# Patient Record
Sex: FEMALE | Race: WHITE | Employment: OTHER | URBAN - METROPOLITAN AREA
[De-identification: names, ages, dates, MRNs, and addresses within clinical notes are randomized per-mention and may not be internally consistent; named-entity substitution may affect disease eponyms.]

---

## 2017-04-26 PROBLEM — M25.561 CHRONIC PAIN OF BOTH KNEES: Status: ACTIVE | Noted: 2017-04-26

## 2017-04-26 PROBLEM — M25.562 CHRONIC PAIN OF BOTH KNEES: Status: ACTIVE | Noted: 2017-04-26

## 2017-04-26 PROBLEM — G89.29 CHRONIC PAIN OF BOTH KNEES: Status: ACTIVE | Noted: 2017-04-26

## 2017-07-26 PROCEDURE — 82570 ASSAY OF URINE CREATININE: CPT | Performed by: FAMILY MEDICINE

## 2017-07-26 PROCEDURE — 82043 UR ALBUMIN QUANTITATIVE: CPT | Performed by: FAMILY MEDICINE

## 2018-04-08 PROBLEM — I69.391 DYSPHAGIA DUE TO OLD CEREBROVASCULAR ACCIDENT: Status: ACTIVE | Noted: 2018-04-08

## 2019-03-15 PROCEDURE — 81001 URINALYSIS AUTO W/SCOPE: CPT | Performed by: FAMILY MEDICINE

## 2019-03-15 PROCEDURE — 87086 URINE CULTURE/COLONY COUNT: CPT | Performed by: FAMILY MEDICINE

## 2019-03-15 PROCEDURE — 87077 CULTURE AEROBIC IDENTIFY: CPT | Performed by: FAMILY MEDICINE

## 2019-03-15 PROCEDURE — 87186 SC STD MICRODIL/AGAR DIL: CPT | Performed by: FAMILY MEDICINE

## 2019-09-12 PROCEDURE — 81001 URINALYSIS AUTO W/SCOPE: CPT | Performed by: FAMILY MEDICINE

## 2019-09-12 PROCEDURE — 87086 URINE CULTURE/COLONY COUNT: CPT | Performed by: FAMILY MEDICINE

## 2020-01-22 ENCOUNTER — APPOINTMENT (OUTPATIENT)
Dept: CV DIAGNOSTICS | Facility: HOSPITAL | Age: 75
DRG: 306 | End: 2020-01-22
Attending: INTERNAL MEDICINE
Payer: MEDICARE

## 2020-01-22 ENCOUNTER — APPOINTMENT (OUTPATIENT)
Dept: GENERAL RADIOLOGY | Facility: HOSPITAL | Age: 75
DRG: 306 | End: 2020-01-22
Attending: INTERNAL MEDICINE
Payer: MEDICARE

## 2020-01-22 ENCOUNTER — HOSPITAL ENCOUNTER (INPATIENT)
Facility: HOSPITAL | Age: 75
LOS: 7 days | Discharge: NURSING, REHAB OR LONG-TERM CARE | DRG: 306 | End: 2020-01-29
Attending: INTERNAL MEDICINE | Admitting: INTERNAL MEDICINE
Payer: MEDICARE

## 2020-01-22 PROBLEM — D64.9 ANEMIA: Status: ACTIVE | Noted: 2020-01-22

## 2020-01-22 PROBLEM — I34.0 SEVERE MITRAL INSUFFICIENCY: Status: ACTIVE | Noted: 2020-01-22

## 2020-01-22 PROBLEM — I50.30 DIASTOLIC CHF DUE TO VALVULAR DISEASE (HCC): Status: ACTIVE | Noted: 2020-01-22

## 2020-01-22 PROBLEM — Z95.5 HISTORY OF CORONARY ANGIOPLASTY WITH INSERTION OF STENT: Status: ACTIVE | Noted: 2020-01-22

## 2020-01-22 PROBLEM — I38 DIASTOLIC CHF DUE TO VALVULAR DISEASE (HCC): Status: ACTIVE | Noted: 2020-01-22

## 2020-01-22 PROBLEM — I25.10 CORONARY ARTERY DISEASE INVOLVING NATIVE CORONARY ARTERY OF NATIVE HEART WITHOUT ANGINA PECTORIS: Status: ACTIVE | Noted: 2020-01-22

## 2020-01-22 LAB
ALBUMIN SERPL-MCNC: 2.9 G/DL (ref 3.4–5)
ALBUMIN/GLOB SERPL: 0.7 {RATIO} (ref 1–2)
ALP LIVER SERPL-CCNC: 56 U/L (ref 55–142)
ALT SERPL-CCNC: 15 U/L (ref 13–56)
ANION GAP SERPL CALC-SCNC: 6 MMOL/L (ref 0–18)
APTT PPP: 20.1 SECONDS (ref 25.4–36.1)
AST SERPL-CCNC: 12 U/L (ref 15–37)
BASOPHILS # BLD AUTO: 0.01 X10(3) UL (ref 0–0.2)
BASOPHILS NFR BLD AUTO: 0.1 %
BILIRUB SERPL-MCNC: 0.4 MG/DL (ref 0.1–2)
BUN BLD-MCNC: 44 MG/DL (ref 7–18)
BUN/CREAT SERPL: 31 (ref 10–20)
CALCIUM BLD-MCNC: 9.1 MG/DL (ref 8.5–10.1)
CHLORIDE SERPL-SCNC: 107 MMOL/L (ref 98–112)
CO2 SERPL-SCNC: 26 MMOL/L (ref 21–32)
CREAT BLD-MCNC: 1.42 MG/DL (ref 0.55–1.02)
DEPRECATED RDW RBC AUTO: 61.2 FL (ref 35.1–46.3)
EOSINOPHIL # BLD AUTO: 0.07 X10(3) UL (ref 0–0.7)
EOSINOPHIL NFR BLD AUTO: 0.4 %
ERYTHROCYTE [DISTWIDTH] IN BLOOD BY AUTOMATED COUNT: 18 % (ref 11–15)
EST. AVERAGE GLUCOSE BLD GHB EST-MCNC: 128 MG/DL (ref 68–126)
GLOBULIN PLAS-MCNC: 4.1 G/DL (ref 2.8–4.4)
GLUCOSE BLD-MCNC: 197 MG/DL (ref 70–99)
GLUCOSE BLD-MCNC: 222 MG/DL (ref 70–99)
GLUCOSE BLD-MCNC: 272 MG/DL (ref 70–99)
GLUCOSE BLD-MCNC: 302 MG/DL (ref 70–99)
HBA1C MFR BLD HPLC: 6.1 % (ref ?–5.7)
HCT VFR BLD AUTO: 26.3 % (ref 35–48)
HGB BLD-MCNC: 8 G/DL (ref 12–16)
IMM GRANULOCYTES # BLD AUTO: 0.19 X10(3) UL (ref 0–1)
IMM GRANULOCYTES NFR BLD: 1 %
INR BLD: 1.04 (ref 0.9–1.1)
IRON SATURATION: 9 % (ref 15–50)
IRON SERPL-MCNC: 25 UG/DL (ref 50–170)
LYMPHOCYTES # BLD AUTO: 0.8 X10(3) UL (ref 1–4)
LYMPHOCYTES NFR BLD AUTO: 4.3 %
M PROTEIN MFR SERPL ELPH: 7 G/DL (ref 6.4–8.2)
MCH RBC QN AUTO: 28.5 PG (ref 26–34)
MCHC RBC AUTO-ENTMCNC: 30.4 G/DL (ref 31–37)
MCV RBC AUTO: 93.6 FL (ref 80–100)
MONOCYTES # BLD AUTO: 0.4 X10(3) UL (ref 0.1–1)
MONOCYTES NFR BLD AUTO: 2.1 %
NEUTROPHILS # BLD AUTO: 17.18 X10 (3) UL (ref 1.5–7.7)
NEUTROPHILS # BLD AUTO: 17.18 X10(3) UL (ref 1.5–7.7)
NEUTROPHILS NFR BLD AUTO: 92.1 %
OSMOLALITY SERPL CALC.SUM OF ELEC: 306 MOSM/KG (ref 275–295)
PLATELET # BLD AUTO: 237 10(3)UL (ref 150–450)
POTASSIUM SERPL-SCNC: 4.3 MMOL/L (ref 3.5–5.1)
PSA SERPL DL<=0.01 NG/ML-MCNC: 14 SECONDS (ref 12.5–14.7)
RBC # BLD AUTO: 2.81 X10(6)UL (ref 3.8–5.3)
SODIUM SERPL-SCNC: 139 MMOL/L (ref 136–145)
TOTAL IRON BINDING CAPACITY: 283 UG/DL (ref 240–450)
TRANSFERRIN SERPL-MCNC: 190 MG/DL (ref 200–360)
VANCOMYCIN SERPL-MCNC: 23.5 UG/ML
WBC # BLD AUTO: 18.7 X10(3) UL (ref 4–11)

## 2020-01-22 PROCEDURE — 93306 TTE W/DOPPLER COMPLETE: CPT | Performed by: INTERNAL MEDICINE

## 2020-01-22 PROCEDURE — 71045 X-RAY EXAM CHEST 1 VIEW: CPT | Performed by: INTERNAL MEDICINE

## 2020-01-22 PROCEDURE — 80202 ASSAY OF VANCOMYCIN: CPT | Performed by: HOSPITALIST

## 2020-01-22 PROCEDURE — B246ZZ4 ULTRASONOGRAPHY OF RIGHT AND LEFT HEART, TRANSESOPHAGEAL: ICD-10-PCS | Performed by: INTERNAL MEDICINE

## 2020-01-22 PROCEDURE — 85610 PROTHROMBIN TIME: CPT | Performed by: NURSE PRACTITIONER

## 2020-01-22 PROCEDURE — 80053 COMPREHEN METABOLIC PANEL: CPT | Performed by: NURSE PRACTITIONER

## 2020-01-22 PROCEDURE — 94640 AIRWAY INHALATION TREATMENT: CPT

## 2020-01-22 PROCEDURE — 99223 1ST HOSP IP/OBS HIGH 75: CPT | Performed by: INTERNAL MEDICINE

## 2020-01-22 PROCEDURE — 87040 BLOOD CULTURE FOR BACTERIA: CPT | Performed by: INTERNAL MEDICINE

## 2020-01-22 PROCEDURE — 83036 HEMOGLOBIN GLYCOSYLATED A1C: CPT | Performed by: HOSPITALIST

## 2020-01-22 PROCEDURE — 85025 COMPLETE CBC W/AUTO DIFF WBC: CPT | Performed by: NURSE PRACTITIONER

## 2020-01-22 PROCEDURE — 83540 ASSAY OF IRON: CPT | Performed by: INTERNAL MEDICINE

## 2020-01-22 PROCEDURE — 5A09557 ASSISTANCE WITH RESPIRATORY VENTILATION, GREATER THAN 96 CONSECUTIVE HOURS, CONTINUOUS POSITIVE AIRWAY PRESSURE: ICD-10-PCS | Performed by: INTERNAL MEDICINE

## 2020-01-22 PROCEDURE — 94664 DEMO&/EVAL PT USE INHALER: CPT

## 2020-01-22 PROCEDURE — 92610 EVALUATE SWALLOWING FUNCTION: CPT

## 2020-01-22 PROCEDURE — 87040 BLOOD CULTURE FOR BACTERIA: CPT | Performed by: NURSE PRACTITIONER

## 2020-01-22 PROCEDURE — 85730 THROMBOPLASTIN TIME PARTIAL: CPT | Performed by: NURSE PRACTITIONER

## 2020-01-22 PROCEDURE — 82962 GLUCOSE BLOOD TEST: CPT

## 2020-01-22 PROCEDURE — 83550 IRON BINDING TEST: CPT | Performed by: INTERNAL MEDICINE

## 2020-01-22 PROCEDURE — 94660 CPAP INITIATION&MGMT: CPT

## 2020-01-22 RX ORDER — LOSARTAN POTASSIUM 50 MG/1
50 TABLET ORAL DAILY
Status: DISCONTINUED | OUTPATIENT
Start: 2020-01-23 | End: 2020-01-25

## 2020-01-22 RX ORDER — CALCIUM CARBONATE 200(500)MG
1 TABLET,CHEWABLE ORAL 3 TIMES DAILY PRN
COMMUNITY

## 2020-01-22 RX ORDER — PREDNISONE 20 MG/1
40 TABLET ORAL DAILY
Status: ON HOLD | COMMUNITY
End: 2020-01-27

## 2020-01-22 RX ORDER — LOSARTAN POTASSIUM 100 MG/1
100 TABLET ORAL DAILY
Status: DISCONTINUED | OUTPATIENT
Start: 2020-01-23 | End: 2020-01-22

## 2020-01-22 RX ORDER — IPRATROPIUM BROMIDE AND ALBUTEROL SULFATE 2.5; .5 MG/3ML; MG/3ML
3 SOLUTION RESPIRATORY (INHALATION) EVERY 4 HOURS PRN
Status: DISCONTINUED | OUTPATIENT
Start: 2020-01-22 | End: 2020-01-22

## 2020-01-22 RX ORDER — SUCRALFATE 1 G/1
1 TABLET ORAL
Status: DISCONTINUED | OUTPATIENT
Start: 2020-01-22 | End: 2020-01-29

## 2020-01-22 RX ORDER — HYDROCHLOROTHIAZIDE 25 MG/1
25 TABLET ORAL DAILY
Status: DISCONTINUED | OUTPATIENT
Start: 2020-01-23 | End: 2020-01-22

## 2020-01-22 RX ORDER — ASPIRIN 81 MG/1
81 TABLET ORAL DAILY
Status: DISCONTINUED | OUTPATIENT
Start: 2020-01-23 | End: 2020-01-22

## 2020-01-22 RX ORDER — BUDESONIDE 0.5 MG/2ML
0.5 INHALANT ORAL
Status: DISCONTINUED | OUTPATIENT
Start: 2020-01-22 | End: 2020-01-29

## 2020-01-22 RX ORDER — PREDNISONE 20 MG/1
40 TABLET ORAL
Status: DISCONTINUED | OUTPATIENT
Start: 2020-01-23 | End: 2020-01-26

## 2020-01-22 RX ORDER — TORSEMIDE 20 MG/1
20 TABLET ORAL DAILY
COMMUNITY

## 2020-01-22 RX ORDER — ISOSORBIDE DINITRATE 5 MG/1
5 TABLET ORAL 3 TIMES DAILY
Status: ON HOLD | COMMUNITY
End: 2020-01-28

## 2020-01-22 RX ORDER — ISOSORBIDE DINITRATE 5 MG/1
5 TABLET ORAL 3 TIMES DAILY
Status: DISCONTINUED | OUTPATIENT
Start: 2020-01-22 | End: 2020-01-22

## 2020-01-22 RX ORDER — ACETAMINOPHEN 325 MG/1
650 TABLET ORAL EVERY 6 HOURS PRN
Status: DISCONTINUED | OUTPATIENT
Start: 2020-01-22 | End: 2020-01-29

## 2020-01-22 RX ORDER — ATORVASTATIN CALCIUM 20 MG/1
20 TABLET, FILM COATED ORAL NIGHTLY
Status: DISCONTINUED | OUTPATIENT
Start: 2020-01-22 | End: 2020-01-29

## 2020-01-22 RX ORDER — TORSEMIDE 20 MG/1
20 TABLET ORAL DAILY
Status: DISCONTINUED | OUTPATIENT
Start: 2020-01-23 | End: 2020-01-22

## 2020-01-22 RX ORDER — HYDRALAZINE HYDROCHLORIDE 25 MG/1
25 TABLET, FILM COATED ORAL 3 TIMES DAILY
Status: DISCONTINUED | OUTPATIENT
Start: 2020-01-22 | End: 2020-01-22 | Stop reason: DRUGHIGH

## 2020-01-22 RX ORDER — IPRATROPIUM BROMIDE AND ALBUTEROL SULFATE 2.5; .5 MG/3ML; MG/3ML
3 SOLUTION RESPIRATORY (INHALATION) EVERY 4 HOURS
Status: DISCONTINUED | OUTPATIENT
Start: 2020-01-22 | End: 2020-01-22

## 2020-01-22 RX ORDER — MAGNESIUM HYDROXIDE/ALUMINUM HYDROXICE/SIMETHICONE 120; 1200; 1200 MG/30ML; MG/30ML; MG/30ML
30 SUSPENSION ORAL EVERY 4 HOURS PRN
COMMUNITY

## 2020-01-22 RX ORDER — NITROGLYCERIN 0.4 MG/1
0.4 TABLET SUBLINGUAL EVERY 5 MIN PRN
Status: DISCONTINUED | OUTPATIENT
Start: 2020-01-22 | End: 2020-01-29

## 2020-01-22 RX ORDER — IPRATROPIUM BROMIDE AND ALBUTEROL SULFATE 2.5; .5 MG/3ML; MG/3ML
3 SOLUTION RESPIRATORY (INHALATION)
Status: DISCONTINUED | OUTPATIENT
Start: 2020-01-22 | End: 2020-01-28

## 2020-01-22 RX ORDER — FUROSEMIDE 10 MG/ML
20 INJECTION INTRAMUSCULAR; INTRAVENOUS
Status: DISCONTINUED | OUTPATIENT
Start: 2020-01-22 | End: 2020-01-25

## 2020-01-22 RX ORDER — GARLIC EXTRACT 500 MG
1 CAPSULE ORAL DAILY
Status: ON HOLD | COMMUNITY
End: 2020-01-22

## 2020-01-22 RX ORDER — LEVOTHYROXINE SODIUM 0.05 MG/1
50 TABLET ORAL
Status: DISCONTINUED | OUTPATIENT
Start: 2020-01-23 | End: 2020-01-29

## 2020-01-22 RX ORDER — HYDRALAZINE HYDROCHLORIDE 10 MG/1
10 TABLET, FILM COATED ORAL EVERY 8 HOURS
Status: DISCONTINUED | OUTPATIENT
Start: 2020-01-22 | End: 2020-01-23

## 2020-01-22 RX ORDER — PANTOPRAZOLE SODIUM 40 MG/1
40 TABLET, DELAYED RELEASE ORAL
Status: DISCONTINUED | OUTPATIENT
Start: 2020-01-22 | End: 2020-01-22 | Stop reason: ALTCHOICE

## 2020-01-22 RX ORDER — ACETAMINOPHEN 500 MG
500 TABLET ORAL EVERY 6 HOURS PRN
COMMUNITY

## 2020-01-22 RX ORDER — DEXTROSE MONOHYDRATE 25 G/50ML
50 INJECTION, SOLUTION INTRAVENOUS
Status: DISCONTINUED | OUTPATIENT
Start: 2020-01-22 | End: 2020-01-23

## 2020-01-22 RX ORDER — SUCRALFATE 1 G/1
1 TABLET ORAL
COMMUNITY

## 2020-01-22 RX ORDER — PANTOPRAZOLE SODIUM 40 MG/1
40 TABLET, DELAYED RELEASE ORAL
Status: ON HOLD | COMMUNITY
End: 2020-01-29

## 2020-01-22 RX ORDER — CALCIUM CARBONATE 200(500)MG
500 TABLET,CHEWABLE ORAL 3 TIMES DAILY PRN
Status: DISCONTINUED | OUTPATIENT
Start: 2020-01-22 | End: 2020-01-29

## 2020-01-22 RX ORDER — ISOSORBIDE DINITRATE 5 MG/1
5 TABLET ORAL EVERY 8 HOURS
Status: DISCONTINUED | OUTPATIENT
Start: 2020-01-22 | End: 2020-01-23

## 2020-01-22 RX ORDER — AMLODIPINE BESYLATE 5 MG/1
5 TABLET ORAL DAILY
Status: DISCONTINUED | OUTPATIENT
Start: 2020-01-23 | End: 2020-01-22

## 2020-01-22 RX ORDER — GARLIC EXTRACT 500 MG
1 CAPSULE ORAL DAILY
Status: DISCONTINUED | OUTPATIENT
Start: 2020-01-22 | End: 2020-01-29

## 2020-01-22 RX ORDER — HEPARIN SODIUM 5000 [USP'U]/ML
5000 INJECTION, SOLUTION INTRAVENOUS; SUBCUTANEOUS EVERY 12 HOURS SCHEDULED
Status: DISCONTINUED | OUTPATIENT
Start: 2020-01-22 | End: 2020-01-22

## 2020-01-22 NOTE — SLP NOTE
ADULT SWALLOWING EVALUATION    ASSESSMENT    ASSESSMENT/OVERALL IMPRESSION:  Orders were received for a bedside swallowing evaluation as patient with a history of oropharyngeal dysphagia with aspiration.  She was at an outside hospital where she was on thin straws; Slow rate;Small bites and sips  Aspiration Precautions: Upright position; Slow rate;Small bites and sips; No straw  Medication Administration Recommendations: No restrictions; One pill at a time; Whole in puree  Treatment Plan/Recommendations: Dysphagia 94%)  Consistencies Trialed: Nectar thick liquids;Puree;Hard solid  Method of Presentation: Self presentation;Spoon;Cup;Single sips  Patient Positioning: Upright;Midline    Oral Phase of Swallow: Impaired  Bolus Retrieval: Intact  Bilabial Seal: Intact  Jesus Alberto

## 2020-01-22 NOTE — PLAN OF CARE
Problem: Impaired Swallowing  Goal: Minimize aspiration risk  Description  Interventions:  - Patient should be alert and upright for all feedings (90 degrees preferred)  - Offer food and liquids at a slow rate  - No straws  - Encourage small bites of connie

## 2020-01-22 NOTE — H&P
[facility]  Cardiology H&P    Brisa Hernandez Patient Status:  Inpatient    6/10/1945 MRN [id number]   [facility] 7NE-A Attending Brie Ruff MD   Hosp Day # 0 PCP Keisha Posey MD         History of Present Illness:  Brisa Hernandez is liquids.     History:  Past Medical History:   Diagnosis Date   • CAD (coronary artery disease)    • CVA (cerebral vascular accident) ([facility] [address] 7/2010   • Depression    • Heart burn    • Hypothyroidism    • Osteoarthrosis, unspecified whether generalized or loc injection 5,000 Units, 5,000 Units, Subcutaneous, 2 times per day  •  [START ON 1/23/2020] amLODIPine Besylate (NORVASC) tab 5 mg, 5 mg, Oral, Daily  •  [START ON 1/23/2020] aspirin EC tab 81 mg, 81 mg, Oral, Daily  •  atorvastatin (LIPITOR) tab 20 mg, 20 jvd; carotids: no bruits; no tm; mouth moist.   Cardiac: Regular rate and rhythm. S1, S2 normal. no pericardial rub, S3.  2/6 holosystolic murmur at the left lower sternal border radiating to the anterior axillary line.   Lungs: Expiratory prolongation with Infectious disease consultation for evaluation of adequacy of prior work-up at [facility] [place] for endocarditis. 4.  Iron studies- venofer if low. 5.  Unloading therapy for her mitral regurgitation.   Would like to increase hydralazine as her blood pres

## 2020-01-22 NOTE — PROGRESS NOTES
01/22/20 1504   Clinical Encounter Type   Visited With Patient and family together  (Brother Matt Whitley) DAGMAR KWAN [facility])   Routine Visit   (Responded to POLST page)   Patient Spiritual Encounters   Spiritual Assessment Completed Yes    received POLST consul

## 2020-01-22 NOTE — CONSULTS
[facility] hospitalist Initial consult note  PCP Willem Ogden MD  Consulted at the request of Dr. Laura Ibrahim    HPI 68 yo female with multiple medical problems including but not limited to CAD, CVA, DM2, HTN, Hypothyroidism  Here per cardiology recommendation  P (PROSTATE) Father    • Other (thyroid disease) Father      Social History    Socioeconomic History      Marital status: Single      Spouse name: Not on file      Number of children: Not on file      Years of education: Not on file      Highest education le TID'  Lactobacillus  Levothyroxine 50 mcg Po daily  Losartan 50 mg PO daily  Metoprolol succinate ER 12.5 PO BID  Pantoprazole 40 mg PO bid  Zosyn stop date 1/24/20  Prednisone 40 mg PO daily  Saline flush  Sodium chronic IV solution 250 mL  Sucralfate 1 g advised BID      NAKUL per protocol    Hypothyroidism; levothyroxine    Hx of CVA swallow eval    Dm2 on insulin ordered, monitor glucose    Preventative SCDs  Not on anticoagulation due to ? ??GI bleed (was reportedly heme positive at OSH)    High complexity

## 2020-01-22 NOTE — CONSULTS
[facility]  Report of Consultation    Rocío Gross Patient Status:  Inpatient    6/10/1945 MRN [id number]   [facility] 7NE-A Attending Dali Fernandez MD   Hosp Day # 0 PCP Marcello Stepehns MD     Reason for Consultation:  Pre-op clear defect      Past Surgical History:   Procedure Laterality Date   • CATH PERCUTANEOUS  TRANSLUMINAL CORONARY ANGIOPLASTY      STENT   • CHOLECYSTECTOMY     • D & C     • LAP, SURG; COLECTOMY, PARTIAL, W/END COLOSTOMY & CLOSURE, DISTAL SEGMENT     • THYROIDE Oral, Q15 Min PRN **OR** dextrose 50 % injection 50 mL, 50 mL, Intravenous, Q15 Min PRN **OR** glucose (DEX4) oral liquid 30 g, 30 g, Oral, Q15 Min PRN **OR** Glucose-Vitamin C (DEX-4) chewable tab 8 tablet, 8 tablet, Oral, Q15 Min PRN  •  Insulin Aspart P cooperative, oriented. No respiratory distress on O2. Head: Normocephalic, without obvious abnormality, atraumatic. Eyes: Conjunctivae/corneas clear. No scleral icterus. No conjunctival     hemorrhage.    Nose: Nares normal.   Throat: Lips, mucosa, a mass/vegetation concerning for endocarditis. Pt had refused abx in the past  · Acute on chronic anemia- felt to be due to GI source, not a candidate for endoscopy at the present time  · NAKUL- on bipap x10 years  · IDDM type 2  · CAD  · CVA- 2010.   Paola Llanes

## 2020-01-23 LAB
CHOLEST SMN-MCNC: 141 MG/DL (ref ?–200)
CREAT BLD-MCNC: 1.44 MG/DL (ref 0.55–1.02)
GLUCOSE BLD-MCNC: 152 MG/DL (ref 70–99)
GLUCOSE BLD-MCNC: 225 MG/DL (ref 70–99)
GLUCOSE BLD-MCNC: 292 MG/DL (ref 70–99)
GLUCOSE BLD-MCNC: 295 MG/DL (ref 70–99)
HDLC SERPL-MCNC: 36 MG/DL (ref 40–59)
LDLC SERPL CALC-MCNC: 68 MG/DL (ref ?–100)
NONHDLC SERPL-MCNC: 105 MG/DL (ref ?–130)
TRIGL SERPL-MCNC: 184 MG/DL (ref 30–149)
VLDLC SERPL CALC-MCNC: 37 MG/DL (ref 0–30)

## 2020-01-23 PROCEDURE — 82962 GLUCOSE BLOOD TEST: CPT

## 2020-01-23 PROCEDURE — 99233 SBSQ HOSP IP/OBS HIGH 50: CPT | Performed by: INTERNAL MEDICINE

## 2020-01-23 PROCEDURE — 97530 THERAPEUTIC ACTIVITIES: CPT

## 2020-01-23 PROCEDURE — 94640 AIRWAY INHALATION TREATMENT: CPT

## 2020-01-23 PROCEDURE — 97161 PT EVAL LOW COMPLEX 20 MIN: CPT

## 2020-01-23 PROCEDURE — 92526 ORAL FUNCTION THERAPY: CPT

## 2020-01-23 PROCEDURE — 80061 LIPID PANEL: CPT | Performed by: HOSPITALIST

## 2020-01-23 PROCEDURE — 94664 DEMO&/EVAL PT USE INHALER: CPT

## 2020-01-23 PROCEDURE — 82565 ASSAY OF CREATININE: CPT | Performed by: HOSPITALIST

## 2020-01-23 PROCEDURE — 97165 OT EVAL LOW COMPLEX 30 MIN: CPT

## 2020-01-23 PROCEDURE — 94010 BREATHING CAPACITY TEST: CPT

## 2020-01-23 RX ORDER — VANCOMYCIN HYDROCHLORIDE
1250
Status: DISCONTINUED | OUTPATIENT
Start: 2020-01-23 | End: 2020-01-23

## 2020-01-23 RX ORDER — MELATONIN
1000 DAILY
Status: DISCONTINUED | OUTPATIENT
Start: 2020-01-23 | End: 2020-01-29

## 2020-01-23 RX ORDER — CODEINE PHOSPHATE AND GUAIFENESIN 10; 100 MG/5ML; MG/5ML
5 SOLUTION ORAL EVERY 6 HOURS PRN
Status: DISCONTINUED | OUTPATIENT
Start: 2020-01-23 | End: 2020-01-29

## 2020-01-23 RX ORDER — BENZONATATE 100 MG/1
100 CAPSULE ORAL 3 TIMES DAILY PRN
Status: DISCONTINUED | OUTPATIENT
Start: 2020-01-23 | End: 2020-01-29

## 2020-01-23 RX ORDER — DEXTROSE MONOHYDRATE 25 G/50ML
50 INJECTION, SOLUTION INTRAVENOUS
Status: DISCONTINUED | OUTPATIENT
Start: 2020-01-23 | End: 2020-01-25

## 2020-01-23 RX ORDER — HYDRALAZINE HYDROCHLORIDE 25 MG/1
25 TABLET, FILM COATED ORAL EVERY 8 HOURS
Status: DISCONTINUED | OUTPATIENT
Start: 2020-01-23 | End: 2020-01-29

## 2020-01-23 RX ORDER — ISOSORBIDE DINITRATE 10 MG/1
10 TABLET ORAL EVERY 8 HOURS
Status: DISCONTINUED | OUTPATIENT
Start: 2020-01-23 | End: 2020-01-29

## 2020-01-23 NOTE — PHYSICAL THERAPY NOTE
PHYSICAL THERAPY QUICK EVALUATION - INPATIENT    Room Number: 7303/5484-T  Evaluation Date: 1/23/2020  Presenting Problem: Severe mitral insufficiency, CHF  Physician Order: PT Eval and Treat    History related to current admission  Jose Quezada is a 69 y diabetes mellitus without mention of complication, not stated as uncontrolled    • Unspecified essential hypertension    • Visual field defect        Past Surgical History  Past Surgical History:   Procedure Laterality Date   • CATH PERCUTANEOUS  TRANSLUMI ACTIVITY TOLERANCE  Pulse: 69  Heart Rate Source: Monitor  Resp: 17  BP: 123/54  BP Location: Left arm  BP Method: Automatic  Patient Position: Sitting    O2 WALK        SPO2 Ambulation on Oxygen: 94  Ambulation oxygen flow (liters per minute): 3 role of PT, POC, DC planning/recs, positioning, and activity. Patient End of Session: Up in chair;Needs met;Call light within reach;RN aware of session/findings; All patient questions and concerns addressed; Alarm set    ASSESSMENT   Patient is a 69 year previous, functional level   Patient able to ambulate on level surfaces At previous, functional level

## 2020-01-23 NOTE — CM/SW NOTE
Pt is a 68 yo female admitted for endocarditis. Pt is single, never , and has no children. Pt's brother Ernie Olivas is her HCPOA. Pt has a history of past CVAs. Pt gets Home 02 from [employer].  Pt on BIPAP.   Pt lives at [facility] in [place] [address]

## 2020-01-23 NOTE — PROGRESS NOTES
[facility]  Progress Note    Tomer Boss Patient Status:  Inpatient    6/10/1945 MRN [id number]   [facility] 7NE-A Attending Roosevelt Bhatti MD   Hosp Day # 1 PCP Diane Nunn MD     Subjective:  Tomer Boss is a(n) 69year old fema Lab 01/22/20  1434   INR 1.04   PTT 20.1*       Cultures:  N/A    Radiology:  CXR 1/22 - Generalized pulmonary venous congestion with mild to moderate cardiac enlargement. Medications reviewed.     Assessment:  · Severe COPD on home O2 since 11/2019 (

## 2020-01-23 NOTE — RESPIRATORY THERAPY NOTE
Pt's family (cousin Tatyana Fontaine)  refused PFT test tonight, said pt is tire, a result will not be the best for pt. RN notified, and will try it tomorrow morning.

## 2020-01-23 NOTE — CONSULTS
[facility] Dosing Service    Initial Pharmacokinetic Consult for Vancomycin Dosing     Alize Eller is a 69year old female who is being treated for possible pneumonia/COPD exacerbation and possible endocarditis .   Pharmacy has been asked to dose Vancomyc

## 2020-01-23 NOTE — CONSULTS
[facility] Dosing Service  Antibiotic Dosing    Belton Call is a 69year old female for whom pharmacy is dosing piperacillin-tazobactam (Zosyn) for treatment of  possible pneumonia and possible endocarditis.     Allergies: is allergic to dilaudid [facility]

## 2020-01-23 NOTE — PROGRESS NOTES
[facility]  Cardiology Progress Note    Subjective:  69year old female transferred here from [place] [address] yesterday for evaluation for a mitraclip.  Currently the patient has no chest pain or shortness of breath and is sitting comfortably in the insulin detemir  10 Units Subcutaneous [email or web address].Listen Up   • budesonide  0.5 mg Nebulization Daily   • omeprazole  40 mg Oral BID AC   • ipratropium-albuterol  3 mL Nebulization 6 times per day     ECHO 1/22/20:  1. Left ventricle:  The cavity size was normal. 1/22/20. 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 Speech following. · HTN: controlled  · DMII: A1C 6.1 1/22/20  · NAKUL: on bipap  · COPD: on home o2    Plan:  · IV diuretics, furosemide 20mg IV BID.  Strict IO and

## 2020-01-23 NOTE — PROGRESS NOTES
01/23/20 0818   Clinical Encounter Type   Referral To Nurse  ( found signed HPOA form in patient's chart/listing Betty Hugo as her HPOA.    to remain available at pager [phone].)

## 2020-01-23 NOTE — PROGRESS NOTES
[facility] hospitalist daily note  Patient was seen/examined on 1/23/20    S; denies pain, denies SOb at rest during my visit, no abd pain, no nausea/emesis  No bleeding    Medications in Epic    PE    01/23/20  1602   BP: 136/53   Pulse: 75   Resp: 18   Temp: 98 glucose  Glucose increased this afternoon therefore increase the  Sliding scale with monitoring    Elevated creatinine; this is better than on lab recorded at OSH  Monitor  Pt is on lasix IV     Preventative SCDs ordered  I checked with the pt and looked a

## 2020-01-23 NOTE — PLAN OF CARE
Assumed care 2100. No complaints of pain.   IV antibiotics started late because of trouble with IV access  BIPAP worn at night  First degree heart block on tele  Meds given crushed with applesauce  Will continue to monitor

## 2020-01-23 NOTE — RESPIRATORY THERAPY NOTE
NAKUL Equipment Usage Summary :            Set Mode :BIPAP W FLEX          Usage in Hours: 9;52          90% Pressure (EPAP) :13            90% Insp Pressure (IPAP);17          AHI : 12          Supplemental Oxygen :  7    LPM

## 2020-01-23 NOTE — CONSULTS
INFECTIOUS DISEASE CONSULTATION    Elin Richmond Patient Status:  Inpatient    6/10/1945 MRN [id number]   [facility] 7NE-A Attending Paco Gan MD   Hosp Day # 1 PCP Quyen Tong MD Other (thyroid disease) Father       reports that she quit smoking about 5 years ago. She has a 50.00 pack-year smoking history. She has never used smokeless tobacco. She reports that she does not drink alcohol or use drugs.     Allergies:    Dilaudid [facility] insulin detemir (LEVEMIR) 100 UNIT/ML flextouch 10 Units, 10 Units, Subcutaneous, [email or web address]  •  budesonide (PULMICORT) 0.5 MG/2ML nebulizer solution 0.5 mg, 0.5 mg, Nebulization, Daily  •  Piperacillin Sod-Tazobactam So (ZOSYN) 4.5 g in dextrose 5 % 10 1  hydrALAzine HCl 25 MG Oral Tab, Take 25 mg by mouth 3 (three) times daily. To be taken if  Systolic Bp > 014 , Disp: , Rfl:   insulin detemir (LEVEMIR FLEXPEN) 100 UNIT/ML Subcutaneous Solution Pen-injector, 10 Units every 12 (twelve) hours.  Inject 16 u range of motion of all extremities. No swelling noted. Joints: no effusions  Skin: No lesions.  No erythema, no open wounds      Laboratory Data:      Recent Labs   Lab 01/22/20  1434   RBC 2.81*   HGB 8.0*   HCT 26.3*   MCV 93.6   MCH 28.5   MCHC 30.4*

## 2020-01-23 NOTE — PLAN OF CARE
NURSING ADMISSION NOTE    Patient admitted via Ambulance  Oriented to room. Safety precautions initiated. Bed in low position. Call light in reach.     Admission navigator completed and med list updated     Patient direct admit from [facility]

## 2020-01-23 NOTE — OCCUPATIONAL THERAPY NOTE
OCCUPATIONAL THERAPY QUICK EVALUATION - INPATIENT    Room Number: 3268/6747-W  Evaluation Date: 1/23/2020     Type of Evaluation: Quick Eval  Presenting Problem: mitral insufficiency    Physician Order: IP Consult to Occupational Therapy  Reason for [facility] With: Staff 24 hours    Toilet and Equipment: Comfort height toilet;Grab bar  Shower/Tub and Equipment: Walk-in shower;Grab bar; Shower chair  Other Equipment: (RW and wheelchair)          Drives: No       Prior Level of Function: Pt has been living at a nu Eating meals?: None    AM-PAC Score:  Score: 16  Approx Degree of Impairment: 53.32%  Standardized Score (AM-PAC Scale): 35.96  CMS Modifier (G-Code): CK    FUNCTIONAL TRANSFER ASSESSMENT  Supine to Sit : Not tested  Sit to Stand: Contact guard assist    S previous functional level  Patient able to dress lower extremities: at previous functional level  Patient/Caregiver able to demonstrate safety with ADLS: at previous functional level

## 2020-01-23 NOTE — SLP NOTE
SPEECH DAILY NOTE - INPATIENT    ASSESSMENT & PLAN   ASSESSMENT  Pt seen for dysphagia tx to assess tolerance with recommended diet, ensure proper utilization of aspiration precautions and provide pt/family education.   Patient seen with recommended diet of swallow strategies independently over 1-2 session(s).     Met     FOLLOW UP  Follow Up Needed: No  SLP Follow-up Date: 01/23/20  Number of Visits to Meet Established Goals: 3    Session: 1 of 3 ( goals met)    If you have any questions, please contact Duncan

## 2020-01-23 NOTE — PROGRESS NOTES
[facility] dosing service    Follow-up Pharmacokinetic Consult for Vancomycin Dosing     Belton Call is a 69year old female who is being treated for possible pneumonia/COPD exacerbation and possible endocarditis  .    Patient is on day 3 of Vancomycin an

## 2020-01-24 ENCOUNTER — APPOINTMENT (OUTPATIENT)
Dept: CV DIAGNOSTICS | Facility: HOSPITAL | Age: 75
DRG: 306 | End: 2020-01-24
Attending: INTERNAL MEDICINE
Payer: MEDICARE

## 2020-01-24 ENCOUNTER — APPOINTMENT (OUTPATIENT)
Dept: INTERVENTIONAL RADIOLOGY/VASCULAR | Facility: HOSPITAL | Age: 75
DRG: 306 | End: 2020-01-24
Attending: INTERNAL MEDICINE
Payer: MEDICARE

## 2020-01-24 LAB
ANION GAP SERPL CALC-SCNC: 4 MMOL/L (ref 0–18)
BASOPHILS # BLD AUTO: 0.02 X10(3) UL (ref 0–0.2)
BASOPHILS NFR BLD AUTO: 0.1 %
BUN BLD-MCNC: 41 MG/DL (ref 7–18)
BUN/CREAT SERPL: 29.5 (ref 10–20)
CALCIUM BLD-MCNC: 8.7 MG/DL (ref 8.5–10.1)
CHLORIDE SERPL-SCNC: 105 MMOL/L (ref 98–112)
CO2 SERPL-SCNC: 31 MMOL/L (ref 21–32)
CREAT BLD-MCNC: 1.39 MG/DL (ref 0.55–1.02)
CREAT BLD-MCNC: 1.39 MG/DL (ref 0.55–1.02)
DEPRECATED RDW RBC AUTO: 58.8 FL (ref 35.1–46.3)
EOSINOPHIL # BLD AUTO: 0.2 X10(3) UL (ref 0–0.7)
EOSINOPHIL NFR BLD AUTO: 1.1 %
ERYTHROCYTE [DISTWIDTH] IN BLOOD BY AUTOMATED COUNT: 17.5 % (ref 11–15)
GLUCOSE BLD-MCNC: 135 MG/DL (ref 70–99)
GLUCOSE BLD-MCNC: 141 MG/DL (ref 70–99)
GLUCOSE BLD-MCNC: 166 MG/DL (ref 70–99)
GLUCOSE BLD-MCNC: 183 MG/DL (ref 70–99)
GLUCOSE BLD-MCNC: 195 MG/DL (ref 70–99)
GLUCOSE BLD-MCNC: 306 MG/DL (ref 70–99)
HCT VFR BLD AUTO: 23.1 % (ref 35–48)
HGB BLD-MCNC: 7.2 G/DL (ref 12–16)
IMM GRANULOCYTES # BLD AUTO: 0.17 X10(3) UL (ref 0–1)
IMM GRANULOCYTES NFR BLD: 0.9 %
LYMPHOCYTES # BLD AUTO: 2.95 X10(3) UL (ref 1–4)
LYMPHOCYTES NFR BLD AUTO: 16.3 %
MCH RBC QN AUTO: 28.9 PG (ref 26–34)
MCHC RBC AUTO-ENTMCNC: 31.2 G/DL (ref 31–37)
MCV RBC AUTO: 92.8 FL (ref 80–100)
MONOCYTES # BLD AUTO: 1.47 X10(3) UL (ref 0.1–1)
MONOCYTES NFR BLD AUTO: 8.1 %
NEUTROPHILS # BLD AUTO: 13.3 X10 (3) UL (ref 1.5–7.7)
NEUTROPHILS # BLD AUTO: 13.3 X10(3) UL (ref 1.5–7.7)
NEUTROPHILS NFR BLD AUTO: 73.5 %
OSMOLALITY SERPL CALC.SUM OF ELEC: 302 MOSM/KG (ref 275–295)
PLATELET # BLD AUTO: 200 10(3)UL (ref 150–450)
POTASSIUM SERPL-SCNC: 3.5 MMOL/L (ref 3.5–5.1)
RBC # BLD AUTO: 2.49 X10(6)UL (ref 3.8–5.3)
SODIUM SERPL-SCNC: 140 MMOL/L (ref 136–145)
WBC # BLD AUTO: 18.1 X10(3) UL (ref 4–11)

## 2020-01-24 PROCEDURE — 82962 GLUCOSE BLOOD TEST: CPT

## 2020-01-24 PROCEDURE — 85025 COMPLETE CBC W/AUTO DIFF WBC: CPT | Performed by: HOSPITALIST

## 2020-01-24 PROCEDURE — 94640 AIRWAY INHALATION TREATMENT: CPT

## 2020-01-24 PROCEDURE — 99153 MOD SED SAME PHYS/QHP EA: CPT

## 2020-01-24 PROCEDURE — 93312 ECHO TRANSESOPHAGEAL: CPT

## 2020-01-24 PROCEDURE — 94667 MNPJ CHEST WALL 1ST: CPT

## 2020-01-24 PROCEDURE — 93320 DOPPLER ECHO COMPLETE: CPT | Performed by: INTERNAL MEDICINE

## 2020-01-24 PROCEDURE — 93325 DOPPLER ECHO COLOR FLOW MAPG: CPT | Performed by: INTERNAL MEDICINE

## 2020-01-24 PROCEDURE — 93312 ECHO TRANSESOPHAGEAL: CPT | Performed by: INTERNAL MEDICINE

## 2020-01-24 PROCEDURE — 99152 MOD SED SAME PHYS/QHP 5/>YRS: CPT | Performed by: INTERNAL MEDICINE

## 2020-01-24 PROCEDURE — 82565 ASSAY OF CREATININE: CPT | Performed by: HOSPITALIST

## 2020-01-24 PROCEDURE — 80048 BASIC METABOLIC PNL TOTAL CA: CPT | Performed by: HOSPITALIST

## 2020-01-24 PROCEDURE — 99152 MOD SED SAME PHYS/QHP 5/>YRS: CPT

## 2020-01-24 RX ORDER — SODIUM CHLORIDE 9 MG/ML
INJECTION, SOLUTION INTRAVENOUS
Status: COMPLETED | OUTPATIENT
Start: 2020-01-24 | End: 2020-01-24

## 2020-01-24 RX ORDER — MIDAZOLAM HYDROCHLORIDE 1 MG/ML
5 INJECTION INTRAMUSCULAR; INTRAVENOUS ONCE
Status: COMPLETED | OUTPATIENT
Start: 2020-01-24 | End: 2020-01-24

## 2020-01-24 NOTE — PROGRESS NOTES
[facility]  Progress Note    Denia Barragan Patient Status:  Inpatient    6/10/1945 MRN [id number]   [facility] 7NE-A Attending Kelsie Mcclain MD   Hosp Day # 2 PCP Kaylee Ramírez MD     Subjective:  Denia Barragan is a(n) 69year old fema Recent Labs   Lab 01/22/20  1434 01/23/20  0618 01/24/20  0547   *  --  135*   BUN 44*  --  41*   CREATSERUM 1.42* 1.44* 1.39*  1.39*   GFRAA 42* 42* 43*  43*   GFRNAA 37* 36* 38*  38*   CA 9.1  --  8.7   ALB 2.9*  --   --      --  140 cultures  · Eventual evaluation for anemia.   Monitor HgB closely  · Aspiration precautions, appreciate speech input  · Prophylaxis- protonix, SCDs, heparin SQ- monitor closely w anemia and presumed GI source     Nancy Maza MD  [facility]

## 2020-01-24 NOTE — RESPIRATORY THERAPY NOTE
NAKUL : EQUIPMENT USE: DAILY SUMMARY                                            SET MODE: AUTO BILEVEL                                          USAGE IN HOURS:10.0                                          90% EXP. PRE

## 2020-01-24 NOTE — PROGRESS NOTES
[facility] hospitalist daily note  Patient was seen/examined on 1/24/20     S; denies pain, breathing ok during my visit, no abd pain, no nausea/emesis  No bleeding  Had ANISHA today, CT surgery involved     Medications in Epic     PE    01/24/20  1330   BP: 120/55 blood at [facility]        NAKUL per protocol     Hypothyroidism; levothyroxine     Hx of CVA  With residual left sided weakness per the pt  specch consulted, advised mechanical soft chopped diet and nectar thick liquids, please see epic     Dm2 on insulin order

## 2020-01-24 NOTE — PROCEDURES
Jacque Ross is a 50-year-old  female who stands 5 feet 7 inches tall and weighs 240 pounds. She underwent simple spirometry on 1/23/2020. She carries a diagnosis of dyspnea. She reports a 50-pack-year smoking history but is stopped smoking.   Re

## 2020-01-24 NOTE — PLAN OF CARE
Assumed care of patient at 0700  A/Ox4, 3L NC, NSR on tele  Patient has baseline slurring and dysphagia d/t previous CVAs  Plan for ANISHA tomorrow  IV abx d/c'd  PRN tylenol given for pain  Patient complaining of severe pain with coughing, very deep, congest

## 2020-01-24 NOTE — PROGRESS NOTES
S/p ANISHA, hemodynamincs stable. Patient sleepy but easily arouses, able to answer questions. Dr. En Hayes at bedside, patient and patient's brother updated.   Will continue to monitor

## 2020-01-24 NOTE — PROGRESS NOTES
· [facility]      Subjective:  Dyspnea better still with mild cough.   Reviewed with son at [facility]    Objective:  144/52  Afebrile  SR 70-80s   I/O equal  BUN/cr 41/1.39   W 18.1K  Hgb 7.2      Neuro: awake/alert speech and left hemiparesis at base an ischemic evaluation in the records.   Lele Owen

## 2020-01-24 NOTE — PLAN OF CARE
Assumed care at 1900  Aox4, slurred speech and L sided weakness - baseline  3L O2/CPAP at night  Strong, non-productive, moist cough  SR via tele  Tolerating diet  Briefed - incontinent at times  Complaint of chest soreness with excessive coughing - PRN ro

## 2020-01-24 NOTE — CONSULTS
[facility]  68 yo with mitral valve endocarditis  Discussed possibility of surgery with pt and brother  With her previous 4 CVAs and severe pulmonary hypertension  She is high risk for surgery. Ashia Oconnor MVR  Further d/w Dr Holli Lopez and Dr Vinicius Dacosta: continue conservative

## 2020-01-24 NOTE — CONSULTS
Seen this afternoon, all pertinent imaging, labs and history reviewed. I discussed case with patient and her daughter in detail; the patient is a poor surgical candidate. There is not active infection currently. We will follow peripherally as needed.

## 2020-01-24 NOTE — PROGRESS NOTES
[facility]                INFECTIOUS DISEASE PROGRESS NOTE    Alix Toanryan Patient Status:  Inpatient    6/10/1945 MRN [id number]   [facility] 7NE-A Attending Adams Garcia MD   Hosp Day # 2 PCP MD Ashutosh Sweeney NAKUL treated with BiPAP     HTN (hypertension)     CVA, old, ataxia     Chronic pain of both knees     Dysphagia due to old cerebrovascular accident     Severe mitral insufficiency     Coronary artery disease involving native coronary artery of native hear

## 2020-01-24 NOTE — PROGRESS NOTES
[facility]  ANISHA Note    Jimmy Parish Location:      MRN [id number]   Admission Date 1/22/2020 Operation Date 1/24/2020   Attending Physician Randi Thompson MD Operating Physician Hemal Ruiz MD     Pre-Operative Diagnosis: Severe MR with flail MV

## 2020-01-24 NOTE — PHYSICAL THERAPY NOTE
In discussion with ROLANDO yesterday afternoon, ROLANDO confirmed patient lives at an [facility], not a skilled nursing facility. Per ROLANDO, pt has been needing quite a lot of assistance at facility and may benefit from rehab prior to return home.  Will pl

## 2020-01-25 LAB
ANION GAP SERPL CALC-SCNC: 6 MMOL/L (ref 0–18)
BASOPHILS # BLD AUTO: 0.03 X10(3) UL (ref 0–0.2)
BASOPHILS NFR BLD AUTO: 0.2 %
BUN BLD-MCNC: 38 MG/DL (ref 7–18)
BUN/CREAT SERPL: 32.8 (ref 10–20)
CALCIUM BLD-MCNC: 8.9 MG/DL (ref 8.5–10.1)
CHLORIDE SERPL-SCNC: 106 MMOL/L (ref 98–112)
CO2 SERPL-SCNC: 30 MMOL/L (ref 21–32)
CREAT BLD-MCNC: 1.16 MG/DL (ref 0.55–1.02)
CREAT BLD-MCNC: 1.25 MG/DL (ref 0.55–1.02)
DEPRECATED HBV CORE AB SER IA-ACNC: 220.7 NG/ML (ref 18–340)
DEPRECATED RDW RBC AUTO: 58.5 FL (ref 35.1–46.3)
EOSINOPHIL # BLD AUTO: 0.07 X10(3) UL (ref 0–0.7)
EOSINOPHIL NFR BLD AUTO: 0.4 %
ERYTHROCYTE [DISTWIDTH] IN BLOOD BY AUTOMATED COUNT: 17.3 % (ref 11–15)
GLUCOSE BLD-MCNC: 200 MG/DL (ref 70–99)
GLUCOSE BLD-MCNC: 202 MG/DL (ref 70–99)
GLUCOSE BLD-MCNC: 212 MG/DL (ref 70–99)
GLUCOSE BLD-MCNC: 237 MG/DL (ref 70–99)
GLUCOSE BLD-MCNC: 261 MG/DL (ref 70–99)
GLUCOSE BLD-MCNC: 299 MG/DL (ref 70–99)
HCT VFR BLD AUTO: 24.1 % (ref 35–48)
HGB BLD-MCNC: 7.4 G/DL (ref 12–16)
IMM GRANULOCYTES # BLD AUTO: 0.16 X10(3) UL (ref 0–1)
IMM GRANULOCYTES NFR BLD: 0.9 %
IRON SATURATION: 29 % (ref 15–50)
IRON SERPL-MCNC: 72 UG/DL (ref 50–170)
LYMPHOCYTES # BLD AUTO: 1.25 X10(3) UL (ref 1–4)
LYMPHOCYTES NFR BLD AUTO: 7.2 %
MCH RBC QN AUTO: 28.7 PG (ref 26–34)
MCHC RBC AUTO-ENTMCNC: 30.7 G/DL (ref 31–37)
MCV RBC AUTO: 93.4 FL (ref 80–100)
MONOCYTES # BLD AUTO: 1.2 X10(3) UL (ref 0.1–1)
MONOCYTES NFR BLD AUTO: 6.9 %
NEUTROPHILS # BLD AUTO: 14.59 X10 (3) UL (ref 1.5–7.7)
NEUTROPHILS # BLD AUTO: 14.59 X10(3) UL (ref 1.5–7.7)
NEUTROPHILS NFR BLD AUTO: 84.4 %
OSMOLALITY SERPL CALC.SUM OF ELEC: 309 MOSM/KG (ref 275–295)
PLATELET # BLD AUTO: 204 10(3)UL (ref 150–450)
POTASSIUM SERPL-SCNC: 3.9 MMOL/L (ref 3.5–5.1)
RBC # BLD AUTO: 2.58 X10(6)UL (ref 3.8–5.3)
SODIUM SERPL-SCNC: 142 MMOL/L (ref 136–145)
TOTAL IRON BINDING CAPACITY: 252 UG/DL (ref 240–450)
TRANSFERRIN SERPL-MCNC: 169 MG/DL (ref 200–360)
WBC # BLD AUTO: 17.3 X10(3) UL (ref 4–11)

## 2020-01-25 PROCEDURE — 94640 AIRWAY INHALATION TREATMENT: CPT

## 2020-01-25 PROCEDURE — 82728 ASSAY OF FERRITIN: CPT | Performed by: HOSPITALIST

## 2020-01-25 PROCEDURE — 82272 OCCULT BLD FECES 1-3 TESTS: CPT | Performed by: HOSPITALIST

## 2020-01-25 PROCEDURE — 97530 THERAPEUTIC ACTIVITIES: CPT

## 2020-01-25 PROCEDURE — 97161 PT EVAL LOW COMPLEX 20 MIN: CPT

## 2020-01-25 PROCEDURE — 80048 BASIC METABOLIC PNL TOTAL CA: CPT | Performed by: HOSPITALIST

## 2020-01-25 PROCEDURE — 82565 ASSAY OF CREATININE: CPT | Performed by: HOSPITALIST

## 2020-01-25 PROCEDURE — 99232 SBSQ HOSP IP/OBS MODERATE 35: CPT | Performed by: INTERNAL MEDICINE

## 2020-01-25 PROCEDURE — 85025 COMPLETE CBC W/AUTO DIFF WBC: CPT | Performed by: HOSPITALIST

## 2020-01-25 PROCEDURE — 83540 ASSAY OF IRON: CPT | Performed by: HOSPITALIST

## 2020-01-25 PROCEDURE — 83550 IRON BINDING TEST: CPT | Performed by: HOSPITALIST

## 2020-01-25 PROCEDURE — 82962 GLUCOSE BLOOD TEST: CPT

## 2020-01-25 RX ORDER — GUAIFENESIN 600 MG
600 TABLET, EXTENDED RELEASE 12 HR ORAL 2 TIMES DAILY
Status: DISCONTINUED | OUTPATIENT
Start: 2020-01-25 | End: 2020-01-29

## 2020-01-25 RX ORDER — LOSARTAN POTASSIUM 50 MG/1
50 TABLET ORAL ONCE
Status: COMPLETED | OUTPATIENT
Start: 2020-01-25 | End: 2020-01-25

## 2020-01-25 RX ORDER — LOSARTAN POTASSIUM 100 MG/1
100 TABLET ORAL DAILY
Status: DISCONTINUED | OUTPATIENT
Start: 2020-01-26 | End: 2020-01-29

## 2020-01-25 RX ORDER — FUROSEMIDE 10 MG/ML
40 INJECTION INTRAMUSCULAR; INTRAVENOUS
Status: DISCONTINUED | OUTPATIENT
Start: 2020-01-25 | End: 2020-01-26

## 2020-01-25 RX ORDER — DEXTROSE MONOHYDRATE 25 G/50ML
50 INJECTION, SOLUTION INTRAVENOUS
Status: DISCONTINUED | OUTPATIENT
Start: 2020-01-25 | End: 2020-01-29

## 2020-01-25 RX ORDER — BENZONATATE 100 MG/1
100 CAPSULE ORAL 3 TIMES DAILY
Status: DISCONTINUED | OUTPATIENT
Start: 2020-01-25 | End: 2020-01-29

## 2020-01-25 NOTE — PROCEDURES
[facility]    PATIENT'S NAME: Mirtha Soctt   ATTENDING PHYSICIAN: Andrea Baird M.D. OPERATING PHYSICIAN: Kaelyn Busby M.D.    PATIENT ACCOUNT#:   [de-identified]    LOCATION:  [address] [place] [address]  MEDICAL RECORD #:   [id number]       DATE OF BIRTH:  [birth year] the posterior leaflet middle scapula. It prolapses between the atrium and ventricle throughout the cardiac cycle. It is very linear, with a length of over 2.25 cm. Maximum diameter is 1 cm. The aortic valve is trileaflet.   The leaflets are nonspecifica vegetation adherent to the base of the posterior mitral valve leaflet middle scapula.   This moves in conjunction with the mitral valve but is separate from the posterior mitral leaflet, which appears degenerated but otherwise structurally normal.  There is

## 2020-01-25 NOTE — PROGRESS NOTES
[facility]  Progress Note    Martha Bernard Patient Status:  Inpatient    6/10/1945 MRN [id number]   [facility] 7NE-A Attending Emmanuel Rich MD   Hosp Day # 3 PCP Joel Hall MD     Subjective:  Martha Bernard is a(n) 69year old fema 7.4*   HCT 26.3* 23.1* 24.1*   .0 200.0 204.0       Recent Labs   Lab 01/22/20  1434  01/24/20  0547 01/25/20  0610 01/25/20  0849   *  --  135*  --  202*   BUN 44*  --  41*  --  38*   CREATSERUM 1.42*   < > 1.39*  1.39* 1.25* 1.16*   GFRAA 4 reduction in FEV1/FVC, but normal ratio suggestive of restriction, but need lung volumes to confirm. May consider outpt PFTs. · Oxygen per protocol, maintain sats >89%  · Bipap qhs   · Diuresis and afterload reduction per cardiology.   await ANISHA results  ·

## 2020-01-25 NOTE — PLAN OF CARE
Problem: Diabetes/Glucose Control  Goal: Glucose maintained within prescribed range  Description  INTERVENTIONS:  - Monitor Blood Glucose as ordered  - Assess for signs and symptoms of hyperglycemia and hypoglycemia  - Administer ordered medications to m care  Description  Interventions:  - Assess ability and encourage patient to participate in ADLs to maximize function  - Promote sitting position while performing ADLs such as feeding, grooming, and bathing  - Educate and encourage patient/family in [place]

## 2020-01-25 NOTE — PLAN OF CARE
Recommend RIKKI upon D/C.  ELOS 7-10 days  Problem: Impaired Functional Mobility  Goal: Achieve highest/safest level of mobility/gait  Description  Interventions:  - Assess patient's functional ability and stability  - Promote increasing activity/tolerance fo

## 2020-01-25 NOTE — PROGRESS NOTES
[facility] Resource Referral Counselor Note    Eulah Distance Patient Status:  Inpatient    6/10/1945 MRN [id number]   [facility] 7NE-A Attending Garry Yu MD   Hosp Day # 3 PCP Shikha Pastor MD       S(subjective) Patient al

## 2020-01-25 NOTE — PROGRESS NOTES
· [facility]      Subjective:  Dyspnea about the same, harsh cough.   Getting a neb now    Objective:  139/52  Afebrile  SR 70-80s   I/O incomplete incontinent  BUN/cr 38/1.16   W 17.3 Hgb 7.4      Neuro: awake/alert speech and left hemiparesis

## 2020-01-25 NOTE — RESPIRATORY THERAPY NOTE
NAKUL - Equipment Use Daily Summary:  · Set Mode AUTO BIPAP  · Usage in hours: 8:40  · 90% Pressure (EPAP) level: 13.5  · 90% Insp Pressure (IPAP): 16.5  · AHI: 13.2  · Supplemental Oxygen:   · Comments:

## 2020-01-25 NOTE — PROGRESS NOTES
[facility] hospitalist daily note  Patient was seen/examined on 1/25/20     S; per pt she is feeling better,  breathing stable, no abd pain, no nausea/emesis  No bleeding       Medications in Epic     PE    01/25/20  0900   BP: 141/57   Pulse: 82   Resp: 14   Tem per the pt  specch consulted, advised mechanical soft chopped diet and nectar thick liquids, please see epic     Dm2 on insulin ordered, monitor glucose  Glucose increased this afternoon therefore increase the  Sliding scale with monitoring     Elevated cr

## 2020-01-25 NOTE — PLAN OF CARE
Assumed care of pt at 1900. A&Ox4, forgetful. VSS, SR on tele. Speech slurred, baseline for pt, can be difficult to understand. O2 at 3L, CPAP with sleep. Lung sounds diminished. Has a strong, congested cough.  Aspiration precautions maintained, tolerating edema, trend weights  Outcome: Progressing

## 2020-01-25 NOTE — PROGRESS NOTES
Went to patient's room to do PHQ 9 screening, unable to do see patient she wants to go to the bathroom. PCT helping the patient. Will try again later.

## 2020-01-26 LAB
ANION GAP SERPL CALC-SCNC: 6 MMOL/L (ref 0–18)
BUN BLD-MCNC: 44 MG/DL (ref 7–18)
BUN/CREAT SERPL: 31.7 (ref 10–20)
CALCIUM BLD-MCNC: 8.4 MG/DL (ref 8.5–10.1)
CHLORIDE SERPL-SCNC: 103 MMOL/L (ref 98–112)
CO2 SERPL-SCNC: 33 MMOL/L (ref 21–32)
CREAT BLD-MCNC: 1.39 MG/DL (ref 0.55–1.02)
GLUCOSE BLD-MCNC: 156 MG/DL (ref 70–99)
GLUCOSE BLD-MCNC: 165 MG/DL (ref 70–99)
GLUCOSE BLD-MCNC: 198 MG/DL (ref 70–99)
GLUCOSE BLD-MCNC: 240 MG/DL (ref 70–99)
GLUCOSE BLD-MCNC: 264 MG/DL (ref 70–99)
OSMOLALITY SERPL CALC.SUM OF ELEC: 308 MOSM/KG (ref 275–295)
POTASSIUM SERPL-SCNC: 3.6 MMOL/L (ref 3.5–5.1)
POTASSIUM SERPL-SCNC: 4.5 MMOL/L (ref 3.5–5.1)
SODIUM SERPL-SCNC: 142 MMOL/L (ref 136–145)

## 2020-01-26 PROCEDURE — 94640 AIRWAY INHALATION TREATMENT: CPT

## 2020-01-26 PROCEDURE — 84132 ASSAY OF SERUM POTASSIUM: CPT | Performed by: INTERNAL MEDICINE

## 2020-01-26 PROCEDURE — 80048 BASIC METABOLIC PNL TOTAL CA: CPT | Performed by: NURSE PRACTITIONER

## 2020-01-26 PROCEDURE — 82962 GLUCOSE BLOOD TEST: CPT

## 2020-01-26 PROCEDURE — 94664 DEMO&/EVAL PT USE INHALER: CPT

## 2020-01-26 PROCEDURE — 97530 THERAPEUTIC ACTIVITIES: CPT

## 2020-01-26 PROCEDURE — 99232 SBSQ HOSP IP/OBS MODERATE 35: CPT | Performed by: NURSE PRACTITIONER

## 2020-01-26 PROCEDURE — 97168 OT RE-EVAL EST PLAN CARE: CPT

## 2020-01-26 RX ORDER — POTASSIUM CHLORIDE 1.5 G/1.77G
40 POWDER, FOR SOLUTION ORAL EVERY 4 HOURS
Status: COMPLETED | OUTPATIENT
Start: 2020-01-26 | End: 2020-01-26

## 2020-01-26 RX ORDER — FUROSEMIDE 10 MG/ML
40 INJECTION INTRAMUSCULAR; INTRAVENOUS DAILY
Status: DISCONTINUED | OUTPATIENT
Start: 2020-01-27 | End: 2020-01-27

## 2020-01-26 NOTE — PROGRESS NOTES
[facility]  Progress Note    Kaelyn Stuart Patient Status:  Inpatient    6/10/1945 MRN [id number]   [facility] 7NE-A Attending Arnie Blount MD   Hosp Day # 4 PCP Gilles Kaiser MD     Subjective:  Kaelyn Stuart is a(n) 69year old fema 204.0       Recent Labs   Lab 01/22/20  1434  01/24/20  0547 01/25/20  0610 01/25/20  0849 01/26/20  0607   *  --  135*  --  202* 156*   BUN 44*  --  41*  --  38* 44*   CREATSERUM 1.42*   < > 1.39*  1.39* 1.25* 1.16* 1.39*   GFRAA 42*   < > 43*  43* but normal ratio suggestive of restriction, but need lung volumes to confirm. May consider outpt PFTs. · Oxygen per protocol, maintain sats >89%  · Bipap qhs   · Diuresis and afterload reduction per cardiology.   await ANISHA results  · Off of abx, ID followi

## 2020-01-26 NOTE — PLAN OF CARE
Assumed care at [facility]. Patient A&Ox4. Tele SR, on 3 L NC, CPAP HS. Lungs diminished, non productive cough. Sched nebs. Denies pain. PRN tums for indigestion. Aspiration precautions. Will cont to monitor.

## 2020-01-26 NOTE — PROGRESS NOTES
· [facility]      Subjective:  Up in chair, much better, no dyspnea still with cough    Objective:  129/57  Afebrile  SR 70-80s   I/O incomplete incontinent  BUN/cr 44/1.39     Neuro: awake/alert speech and left hemiparesis at baseline  HEENT:

## 2020-01-26 NOTE — OCCUPATIONAL THERAPY NOTE
OCCUPATIONAL THERAPY EVALUATION - INPATIENT     Room Number: 2313/5893-O  Evaluation Date: 1/26/2020  Type of Evaluation: Re-evaluation  Presenting Problem: severe mitral insufficiency    Physician Order: IP Consult to Occupational Therapy  Reason for Ther hours    Toilet and Equipment: Comfort height toilet;Grab bar  Shower/Tub and Equipment: Walk-in shower;Grab bar; Shower chair  Other Equipment: (RW and wheelchair)          Drives: No  Patient Regularly Uses:  Other (Comment)(RW)    Prior Level of Function: CGA/supervision for 5 minutes. Sit to stand with max A today, afterwhich pt feeling unsteady and fatigued requesting to sit back down onto bed. Pt placed back in supine with min A. Total assist to reposition pt up towards [address].    Patient End of Session: Up assessments, several treatment options    Overall Complexity MODERATE     OT Discharge Recommendations: Sub-acute rehabilitation(ELOS 7-10 days)  OT Device Recommendations: 3-in-1 commode;Sock aid;Reacher    PLAN  OT Treatment Plan: Compensatory technique

## 2020-01-26 NOTE — RESPIRATORY THERAPY NOTE
NAKUL - Equipment Use Daily Summary:  · Set Mode AUTO BIPAP  · Usage in hours: 10:35  · 90% Pressure (EPAP) level: 11  · 90% Insp Pressure (IPAP): 14  · AHI: 31.7  · Supplemental Oxygen:   · Comments:

## 2020-01-26 NOTE — PROGRESS NOTES
S; doing better.   Breathing is stable.         Medications in Epic     PE    01/26/20  0745   BP:    Pulse: 72   Resp: 16   Temp:      Gen: awake, alert, no acute distress, O2 NC in place pt is on 3L   HEENT; mmm, anicteric  Pulm: no wheezing  Abd; soft, clinically, no overt bleeding  - cont ppi     NAKUL per protocol     Hypothyroidism; levothyroxine     Hx of CVA  With residual left sided weakness per the pt  speech consulted, advised mechanical soft chopped diet and nectar thick liquids, please see epic

## 2020-01-26 NOTE — PLAN OF CARE
Received patient a/Ox4, 3L nasal cannula, NSR on tele at 0700  Patient re-evaluated by PT, recommend RIKKI  Stool sample (+) for occult blood, phsycian notified, CBC drawn, Hemoglobin 7.4  Patient seen by Psych liaison briefly, became emotional and cough int function  - Promote sitting position while performing ADLs such as feeding, grooming, and bathing  - Educate and encourage patient/family in tolerated functional activity level and precautions during self-care     Outcome: Progressing     Problem: Impaired

## 2020-01-27 LAB
ANION GAP SERPL CALC-SCNC: 6 MMOL/L (ref 0–18)
BUN BLD-MCNC: 42 MG/DL (ref 7–18)
BUN/CREAT SERPL: 31.1 (ref 10–20)
CALCIUM BLD-MCNC: 8.4 MG/DL (ref 8.5–10.1)
CHLORIDE SERPL-SCNC: 105 MMOL/L (ref 98–112)
CO2 SERPL-SCNC: 34 MMOL/L (ref 21–32)
CREAT BLD-MCNC: 1.35 MG/DL (ref 0.55–1.02)
DEPRECATED RDW RBC AUTO: 62.2 FL (ref 35.1–46.3)
ERYTHROCYTE [DISTWIDTH] IN BLOOD BY AUTOMATED COUNT: 17.9 % (ref 11–15)
GLUCOSE BLD-MCNC: 127 MG/DL (ref 70–99)
GLUCOSE BLD-MCNC: 145 MG/DL (ref 70–99)
GLUCOSE BLD-MCNC: 231 MG/DL (ref 70–99)
GLUCOSE BLD-MCNC: 235 MG/DL (ref 70–99)
GLUCOSE BLD-MCNC: 292 MG/DL (ref 70–99)
HCT VFR BLD AUTO: 23.8 % (ref 35–48)
HGB BLD-MCNC: 7.2 G/DL (ref 12–16)
MCH RBC QN AUTO: 29.4 PG (ref 26–34)
MCHC RBC AUTO-ENTMCNC: 30.3 G/DL (ref 31–37)
MCV RBC AUTO: 97.1 FL (ref 80–100)
OSMOLALITY SERPL CALC.SUM OF ELEC: 312 MOSM/KG (ref 275–295)
PLATELET # BLD AUTO: 215 10(3)UL (ref 150–450)
POTASSIUM SERPL-SCNC: 3.7 MMOL/L (ref 3.5–5.1)
RBC # BLD AUTO: 2.45 X10(6)UL (ref 3.8–5.3)
SODIUM SERPL-SCNC: 145 MMOL/L (ref 136–145)
WBC # BLD AUTO: 20.5 X10(3) UL (ref 4–11)

## 2020-01-27 PROCEDURE — 82962 GLUCOSE BLOOD TEST: CPT

## 2020-01-27 PROCEDURE — 80048 BASIC METABOLIC PNL TOTAL CA: CPT | Performed by: NURSE PRACTITIONER

## 2020-01-27 PROCEDURE — 94640 AIRWAY INHALATION TREATMENT: CPT

## 2020-01-27 PROCEDURE — 99232 SBSQ HOSP IP/OBS MODERATE 35: CPT | Performed by: INTERNAL MEDICINE

## 2020-01-27 PROCEDURE — 85027 COMPLETE CBC AUTOMATED: CPT | Performed by: NURSE PRACTITIONER

## 2020-01-27 RX ORDER — IPRATROPIUM BROMIDE AND ALBUTEROL SULFATE 2.5; .5 MG/3ML; MG/3ML
3 SOLUTION RESPIRATORY (INHALATION)
Qty: 100 VIAL | Refills: 0 | Status: SHIPPED | OUTPATIENT
Start: 2020-01-27 | End: 2020-01-28

## 2020-01-27 RX ORDER — TORSEMIDE 20 MG/1
20 TABLET ORAL DAILY
Status: DISCONTINUED | OUTPATIENT
Start: 2020-01-27 | End: 2020-01-29

## 2020-01-27 RX ORDER — POTASSIUM CHLORIDE 14.9 MG/ML
20 INJECTION INTRAVENOUS ONCE
Status: COMPLETED | OUTPATIENT
Start: 2020-01-27 | End: 2020-01-27

## 2020-01-27 RX ORDER — ALPRAZOLAM 0.25 MG/1
0.25 TABLET ORAL 3 TIMES DAILY PRN
Status: DISCONTINUED | OUTPATIENT
Start: 2020-01-27 | End: 2020-01-29

## 2020-01-27 RX ORDER — BENZONATATE 100 MG/1
100 CAPSULE ORAL 3 TIMES DAILY PRN
Qty: 100 CAPSULE | Refills: 0 | Status: SHIPPED | OUTPATIENT
Start: 2020-01-27 | End: 2020-01-29

## 2020-01-27 RX ORDER — BUDESONIDE 0.5 MG/2ML
0.5 INHALANT ORAL
Qty: 60 AMPULE | Refills: 0 | Status: SHIPPED | OUTPATIENT
Start: 2020-01-28 | End: 2020-01-29

## 2020-01-27 NOTE — CM/SW NOTE
Spoke with admissions director [facility] who said pt can come back to them and she can participate in PT there. Will set up [facility] for pt as well. Pt's brother Jose Golden aware.

## 2020-01-27 NOTE — PLAN OF CARE
A/OX4, CPAP at night, VSS, SR per Tele  Denies pain at this time  No acute events overnight  Will cont to monitor.

## 2020-01-27 NOTE — PROGRESS NOTES
[facility]  Cardiology Progress Note    Subjective:  No chest pain or shortness of breath.  supportive at the bedside.     Objective:  /59 (BP Location: Left arm)   Pulse 81   Temp 98.2 °F (36.8 °C) (Oral)   Resp 15   Ht 5' 7\" (1.702 m) repair and not a surgical candidate. Palliative medical therapy only treatment option. . Blood cultures are negative, ID following. · Pulmonary hypertension secondary to MR. · Anemia: HGB of 6 on arrival to [address] and was given 3 units PRBC.

## 2020-01-27 NOTE — PROGRESS NOTES
[facility]  Progress Note    Marianne Jaquez Patient Status:  Inpatient    6/10/1945 MRN [id number]   [facility] 7NE-A Attending Anton Rodriguez MD   Hosp Day # 5 PCP Loan Heller MD     Subjective:  Marianne Jaquez is a(n) 69year old fema No results for input(s): MG in the last 168 hours.     No results found for: PHOS, PHOSPHORUS     Recent Labs   Lab 01/22/20  1434   INR 1.04   PTT 20.1*       No results for input(s): ABGPHT, HAOJSD6S, UHGXT2A, ABGHCO3, ABGBE, TEMP, KARELY, SITE, DEV, mg, 20 mg, Oral, Nightly  escitalopram (LEXAPRO) tablet 15 mg, 15 mg, Oral, Daily  Levothyroxine Sodium (SYNTHROID) tab 50 mcg, 50 mcg, Oral, Before breakfast  Acidophilus/Pectin (PROBIOTIC) CAPS 1 capsule, 1 capsule, Oral, Daily  Calcium Carbonate Antacid protocol, maintain sats >89%  · Bipap qhs. Can follow up as outpatient for new machine prn  · Diuresis and afterload reduction per cardiology.    · Off of abx, ID following  · Eventual evaluation for anemia.  Monitor HgB closely  · Aspiration precautions,

## 2020-01-27 NOTE — PLAN OF CARE
Assumed care of patient at 0700  Patient considerably weaker this shift than previous shifts  Patient's cough seems to have improved  Patient was tearful and crying several times throughout shift and displaying emotions of depression and regret  Patient wa

## 2020-01-27 NOTE — PHYSICAL THERAPY NOTE
Attempted to see Pt this AM - RN aware of attempt. Pt occupied with RT for bedside treatment. Will f/u later today if time permits, after all other patients are attempted per tentative schedule.

## 2020-01-27 NOTE — CM/SW NOTE
PT is recommending [facility]. Spoke with pt's brother Landy Vega who said pt really does not want to go to [facility] and would prefer to stay at [facility] and get therapy there. She is also interested in palliative care at [facility].   Will call [facility] and s

## 2020-01-27 NOTE — PROGRESS NOTES
S; no acute events. Denies overt bleeding.   Breathing ok.         Medications in Epic     PE    01/27/20  0837   BP: 134/59   Pulse: 81   Resp: 15   Temp: 98.2 °F (36.8 °C)     Gen: awake, alert, no acute distress,  HEENT; mmm, anicteric  Pulm: no wheez entirely clear but may have been due to acute on chronic blood loss,   - follow clinically, no overt bleeding  - cont ppi     NAKUL per protocol     Hypothyroidism; levothyroxine     Hx of CVA with residual left sided weakness per the pt  speech consulted, a

## 2020-01-27 NOTE — PROGRESS NOTES
[facility]                INFECTIOUS DISEASE PROGRESS NOTE    Jose Guadalupe Mcgraw Patient Status:  Inpatient    6/10/1945 MRN [id number]   [facility] 7NE-A Attending Victory Goodell, MD   Hosp Day # 5 PCP MD Liane Day results found for: [facility] Encounter on 01/22/20   1.  BLOOD CULTURE     Status: None (Preliminary result)    Collection Time: 01/22/20  8:44 PM   Result Value Ref Range    Blood Culture Result No Growth 4 Days N/A       Problem list

## 2020-01-28 LAB
DEPRECATED RDW RBC AUTO: 60.7 FL (ref 35.1–46.3)
ERYTHROCYTE [DISTWIDTH] IN BLOOD BY AUTOMATED COUNT: 17.8 % (ref 11–15)
GLUCOSE BLD-MCNC: 136 MG/DL (ref 70–99)
GLUCOSE BLD-MCNC: 222 MG/DL (ref 70–99)
GLUCOSE BLD-MCNC: 285 MG/DL (ref 70–99)
GLUCOSE BLD-MCNC: 338 MG/DL (ref 70–99)
HCT VFR BLD AUTO: 24.6 % (ref 35–48)
HGB BLD-MCNC: 7.3 G/DL (ref 12–16)
MCH RBC QN AUTO: 28.6 PG (ref 26–34)
MCHC RBC AUTO-ENTMCNC: 29.7 G/DL (ref 31–37)
MCV RBC AUTO: 96.5 FL (ref 80–100)
PLATELET # BLD AUTO: 206 10(3)UL (ref 150–450)
POTASSIUM SERPL-SCNC: 3.6 MMOL/L (ref 3.5–5.1)
POTASSIUM SERPL-SCNC: 5.1 MMOL/L (ref 3.5–5.1)
RBC # BLD AUTO: 2.55 X10(6)UL (ref 3.8–5.3)
WBC # BLD AUTO: 20.5 X10(3) UL (ref 4–11)

## 2020-01-28 PROCEDURE — 94640 AIRWAY INHALATION TREATMENT: CPT

## 2020-01-28 PROCEDURE — 85027 COMPLETE CBC AUTOMATED: CPT | Performed by: INTERNAL MEDICINE

## 2020-01-28 PROCEDURE — 84132 ASSAY OF SERUM POTASSIUM: CPT | Performed by: INTERNAL MEDICINE

## 2020-01-28 PROCEDURE — 82962 GLUCOSE BLOOD TEST: CPT

## 2020-01-28 PROCEDURE — 97535 SELF CARE MNGMENT TRAINING: CPT

## 2020-01-28 PROCEDURE — 99232 SBSQ HOSP IP/OBS MODERATE 35: CPT | Performed by: INTERNAL MEDICINE

## 2020-01-28 RX ORDER — IPRATROPIUM BROMIDE AND ALBUTEROL SULFATE 2.5; .5 MG/3ML; MG/3ML
3 SOLUTION RESPIRATORY (INHALATION)
Qty: 100 VIAL | Refills: 0 | Status: SHIPPED | OUTPATIENT
Start: 2020-01-28

## 2020-01-28 RX ORDER — IPRATROPIUM BROMIDE AND ALBUTEROL SULFATE 2.5; .5 MG/3ML; MG/3ML
3 SOLUTION RESPIRATORY (INHALATION)
Status: DISCONTINUED | OUTPATIENT
Start: 2020-01-28 | End: 2020-01-29

## 2020-01-28 RX ORDER — PREDNISONE 10 MG/1
TABLET ORAL
Qty: 30 TABLET | Refills: 0 | Status: SHIPPED | OUTPATIENT
Start: 2020-01-28 | End: 2020-01-29

## 2020-01-28 RX ORDER — ISOSORBIDE DINITRATE 10 MG/1
10 TABLET ORAL EVERY 8 HOURS
Qty: 90 TABLET | Refills: 3 | Status: SHIPPED | OUTPATIENT
Start: 2020-01-28

## 2020-01-28 RX ORDER — METOPROLOL SUCCINATE 25 MG/1
12.5 TABLET, EXTENDED RELEASE ORAL
Qty: 60 TABLET | Refills: 1 | Status: SHIPPED | OUTPATIENT
Start: 2020-01-28

## 2020-01-28 RX ORDER — POTASSIUM CHLORIDE 1.5 G/1.77G
40 POWDER, FOR SOLUTION ORAL EVERY 4 HOURS
Status: COMPLETED | OUTPATIENT
Start: 2020-01-28 | End: 2020-01-28

## 2020-01-28 NOTE — CM/SW NOTE
Pt is ready for d/c today. Pt will return to [facility]. Pt will get PT there.   Called [facility] who said they cannot accept pt today because the room they are moving her to is currently being painted; however, it will be ready by tomorrow after

## 2020-01-28 NOTE — CM/SW NOTE
Order received for new BIPAP - faxed orders to Eddi at [phone] [phone] as pt will private pay for this new BIPAP. Waiting to hear back from this [employer] company.

## 2020-01-28 NOTE — RESPIRATORY THERAPY NOTE
NAKUL : EQUIPMENT USE: DAILY SUMMARY                                            SET MODE:                                          USAGE IN HOURS:                                          90% EXP. PRESSURE(EPAP):

## 2020-01-28 NOTE — PROGRESS NOTES
[facility]  Cardiology Progress Note    Subjective:  No chest pain or shortness of breath. Family supportive at bedside.      Objective:  /52 (BP Location: Right arm)   Pulse 71   Temp 96.9 °F (36.1 °C) (Axillary)   Resp 13   Ht 5' 7\" (1.702 m) arrival to [facility] and was given 3 units PRBC. HGB 7.3 this AM. Stable but low over the past 4 days.  GI has no plan to undergo endoscopic evaluation and has signed off of the case. +guiac test, no gross bleeding  · CAD: hx of remote PCI  · Hx of

## 2020-01-28 NOTE — DISCHARGE SUMMARY
General Medicine Discharge Summary     Patient ID:  Eulah Marivel  69year old  6/10/1945    Admit date: 1/22/2020    Discharge date and time:1/29/2020    Attending Physician: Garry Yu MD     Primary 6.6, rectal exam was done and guiac positive, no gross blood  was evaluated by Gi at OSH, etiology was not entirely clear but may have been due to acute on chronic blood loss,   - follow clinically, no overt bleeding  - cont ppi     NAKUL per protocol     Hy meals.    sucralfate 1 g Oral Tab  Take 1 g by mouth 4 (four) times daily before meals and nightly. torsemide 20 MG Oral Tab  Take 20 mg by mouth daily.     Calcium Carbonate Antacid 500 MG Oral Chew Tab  Chew 1 tablet by mouth 3 (three) times daily as n RRR  Lungs: clear bilaterally, no active wheezing  Abdomen: nontender, nondistended, intact BS  Extremities: no pedal edema   Neuro: CN inact, no focal deficits      Total time coordinating care for discharge: Greater than 30 minutes    . Kirill YUSUF

## 2020-01-28 NOTE — CM/SW NOTE
Referral sent to [facility] for palliative care once pt is d/c'd. Waiting for a response.

## 2020-01-28 NOTE — PROGRESS NOTES
[facility]  Progress Note    Mendel Batman Patient Status:  Inpatient    6/10/1945 MRN [id number]   [facility] 7NE-A Attending Alphonse Wallace MD   Hosp Day # 6 PCP Jessie Manriquez MD     Subjective:  Mendel Batman is a(n) 69year old fema 20.5*   HGB 7.4* 7.2* 7.3*   HCT 24.1* 23.8* 24.6*   .0 215.0 206.0       Recent Labs   Lab 01/22/20  1434  01/25/20  0849 01/26/20  0607 01/26/20  1447 01/27/20  0550 01/28/20  0618   *   < > 202* 156*  --  127*  --    BUN 44*   < > 38* 44* measures  · Continue nebulized budesonide; unclear if able to use inhaler properly  · Wean prednisone, day #2 30mg daily.  Wean 10mg every 3 days  · bedside spirometry shows severe reduction in FEV1/FVC, but normal ratio suggestive of restriction, but need

## 2020-01-28 NOTE — PLAN OF CARE
Assumed care of patient at 0700  A/Ox4, 3L NC, NSR on tele  Patient's cough much better today  Patient was tearful throughout shift  Family members visited throughout the day  PRN Xanax given for anxiety  PRN Robitussin given for pain  Patient started oral ex. Angina  - Evaluate fluid balance, assess for edema, trend weights  Outcome: Progressing

## 2020-01-28 NOTE — PHYSICAL THERAPY NOTE
PHYSICAL THERAPY TREATMENT NOTE - INPATIENT    Room Number: 8667/2292-Y     Session: 1   Number of Visits to Meet Established Goals: 5    Presenting Problem: severe mitral insufficiency    History related to current admission: pt admitted to assess cardia RESTRICTION  Weight Bearing Restriction: None                PAIN ASSESSMENT   Ratin  Location: denies pain       BALANCE                                                                                                                     Static Sitting force generation and stability. Pt required cueing to stand up straight and tuck her hips under her. Pt was able to ambulate ~ 10 ft using RW with min assist before stating she needed to sit due to LE fatigue.  Pt demonstrated an impulsive and fast stand to limitations in independent bed mobility, transfers, gait, and functional mobility.  The patient is below baseline and would continue to benefit from skilled inpatient PT to address the above deficits to assist patient in returning to prior to level of funct

## 2020-01-28 NOTE — OCCUPATIONAL THERAPY NOTE
OCCUPATIONAL THERAPY TREATMENT NOTE - INPATIENT     Room Number: 5253/8272-S  Session: 1   Number of Visits to Meet Established Goals: 5    Presenting Problem: severe mitral insufficiency    History related to current admission:  Pt was admitted from [facility] ACTIVITY TOLERANCE                         O2 SATURATIONS        SPO2 Ambulation on Oxygen: 97  Ambulation oxygen flow (liters per minute): 3    ACTIVITIES OF DAILY LIVING ASSESSMENT  AM-PAC ‘6-Clicks’ Inpatient Daily Activity Short Form  How much help home ([facility]?) since September. Uses 3 liters oxygen. L sided weakness and dysarthria from old CVA. Per pt, on \"good days,\" pt ambulates with RW in her room to bathroom with min A.   Staff assists with her with toilet transfer, toileting, and hygien

## 2020-01-29 VITALS
DIASTOLIC BLOOD PRESSURE: 47 MMHG | RESPIRATION RATE: 23 BRPM | SYSTOLIC BLOOD PRESSURE: 141 MMHG | TEMPERATURE: 99 F | HEIGHT: 67 IN | WEIGHT: 233.25 LBS | OXYGEN SATURATION: 100 % | HEART RATE: 85 BPM | BODY MASS INDEX: 36.61 KG/M2

## 2020-01-29 LAB
GLUCOSE BLD-MCNC: 128 MG/DL (ref 70–99)
GLUCOSE BLD-MCNC: 282 MG/DL (ref 70–99)
GLUCOSE BLD-MCNC: 304 MG/DL (ref 70–99)

## 2020-01-29 PROCEDURE — 94640 AIRWAY INHALATION TREATMENT: CPT

## 2020-01-29 PROCEDURE — 99238 HOSP IP/OBS DSCHRG MGMT 30/<: CPT | Performed by: NURSE PRACTITIONER

## 2020-01-29 PROCEDURE — 82962 GLUCOSE BLOOD TEST: CPT

## 2020-01-29 RX ORDER — PREDNISONE 10 MG/1
TABLET ORAL
Qty: 30 TABLET | Refills: 0 | Status: SHIPPED | OUTPATIENT
Start: 2020-01-29 | End: 2020-01-29

## 2020-01-29 RX ORDER — PREDNISONE 10 MG/1
TABLET ORAL
Qty: 30 TABLET | Refills: 0 | Status: SHIPPED | OUTPATIENT
Start: 2020-01-29

## 2020-01-29 RX ORDER — BUDESONIDE 0.5 MG/2ML
0.5 INHALANT ORAL
Qty: 60 AMPULE | Refills: 0 | Status: SHIPPED | OUTPATIENT
Start: 2020-01-29

## 2020-01-29 RX ORDER — BENZONATATE 100 MG/1
100 CAPSULE ORAL 3 TIMES DAILY PRN
Qty: 100 CAPSULE | Refills: 0 | Status: SHIPPED | OUTPATIENT
Start: 2020-01-29

## 2020-01-29 NOTE — DIETARY NOTE
Dory Coello 42     Admitting diagnosis:  ENDOCARDITIS  Severe mitral insufficiency    Ht: 170.2 cm (5' 7\")  Wt: 105.8 kg (233 lb 4 oz). This is 172 % of IBW  Body mass index is 36.53 kg/m².   IBW: 61.4 kg    Labs/Meds r

## 2020-01-29 NOTE — PROGRESS NOTES
[facility]  Progress Note    Akhil Oropeza Patient Status:  Inpatient    6/10/1945 MRN [id number]   [facility] 7NE-A Attending Blaire Wise MD   Hosp Day # 7 PCP Daniol Bob MD     Subjective:  Akhil Oropeza is a(n) 69year old fema 20.5*   HGB 7.4* 7.2* 7.3*   HCT 24.1* 23.8* 24.6*   .0 215.0 206.0       Recent Labs   Lab 01/22/20  1434  01/25/20  0849 01/26/20  0607  01/27/20  0550 01/28/20  0618 01/28/20  1638   *   < > 202* 156*  --  127*  --   --    BUN 44*   < > 38 bronchodilators nebulized to QID  · Continue flutter valve; airway clearance measures  · Continue nebulized budesonide; unclear if able to use inhaler properly  · Wean prednisone, day #3 30mg daily.  Wean 10mg every 3 days  · bedside spirometry shows severe

## 2020-01-29 NOTE — PLAN OF CARE
Problem: Diabetes/Glucose Control  Goal: Glucose maintained within prescribed range  Description  INTERVENTIONS:  - Monitor Blood Glucose as ordered  - Assess for signs and symptoms of hyperglycemia and hypoglycemia  - Administer ordered medication mobility/gait  Description  Interventions:  - Assess patient's functional ability and stability  - Promote increasing activity/tolerance for mobility and gait  - Educate and engage patient/family in tolerated activity level and precautions  - Recommend use

## 2020-01-29 NOTE — PLAN OF CARE
A/OX4, CPAP at night, VSS, SR per tele  Denies pain at this time  Turned and repositioned for comfort   Will cont to monitor.

## 2020-01-29 NOTE — PROGRESS NOTES
S; no acute events. Denies overt bleeding.   Breathing ok.         Medications in Epic     PE    01/29/20  1219   BP: 123/51   Pulse: 79   Resp: 19   Temp: 98 °F (36.7 °C)     Gen: awake, alert, no acute distress,  HEENT; mmm, anicteric  Pulm: no wheezin ordered    Anemia;   Per Jan Le doc note 1/17/20 Hg 6.6, rectal exam was done and guiac positive, no gross blood  was evaluated by Gi at OSH, etiology was not entirely clear but may have been due to acute on chronic blood loss,   - follow clinically, no o

## 2020-01-29 NOTE — RESPIRATORY THERAPY NOTE
NAKUL Equipment Usage Summary :            Set Mode :AUTO CPAP W FLEX          Usage in Hours:11;52          90% Pressure (EPAP) : 8           90% Insp Pressure (IPAP);11          AHI : 10.7          Supplemental Oxygen :   3  LPM

## 2020-01-29 NOTE — CM/SW NOTE
Order received for nebulizer machine - order sent to [facility]. Waiting for a response.

## 2020-01-29 NOTE — CM/SW NOTE
Pt will d/c today back to [facility]. Spoke with Sister Sue Herbert at [facility] who said her room is ready. RN to call report to [phone].   Kaelyn Bower pt's cousin called to say that [phone] called her and they will deliver pt's new

## 2020-01-29 NOTE — PROGRESS NOTES
[facility]  Cardiology Progress Note    Subjective:  No chest pain or shortness of breath.  On BiPAP while sleeping     Objective:  /49 (BP Location: Right arm)   Pulse 70   Temp 97.4 °F (36.3 °C) (Axillary)   Resp 16   Ht 5' 7\" (1.702 m)   Wt 2 bipap  · COPD: on home o2    Plan:  · Home asa per GI  · Plan for d/c to rehab today  · Follow up with primary cardiologist in 2 weeks    KAMILAH De La O  1/29/2020  8:53 AM

## 2020-01-30 NOTE — PROGRESS NOTES
Spoke with sister Calos Lyn at [place] at [phone]. Gave report to Meggan Freeman prior to patient being transferred via ambulance. All questions answered, verbalized understanding.

## 2020-01-30 NOTE — PROGRESS NOTES
NURSING DISCHARGE NOTE      Discharge AVS completed. Patient okay to discharge per all consults. Discharge instructions and orders reviewed with patient and Sister Xiomara Coleman at [facility]. All questions answered, verbalized understanding.    Signs and s

## 2020-01-30 NOTE — CM/SW NOTE
01/30/20 0900   Discharge disposition   Expected discharge disposition [facility]   Name of Facillity/Home Care/Hospice   (Jorge Luis PAINTER)   Home services after discharge DME   HME provider [facility]   Discharge transportation [facility]

## 2020-01-30 NOTE — PLAN OF CARE
Assumed care of patient at 07:30 am.   Pt A/O x 4. NSR per tele, SPO2 maintained on 3L NC and CPAP when sleeping  Patient cleared by all consults to discharge to [facility] assisted living.    Orders for BIPAP and Nebulizer obtained prior to discharge an care  Description  Interventions:  - Assess ability and encourage patient to participate in ADLs to maximize function  - Promote sitting position while performing ADLs such as feeding, grooming, and bathing  - Educate and encourage patient/family in [place]

## 2025-07-03 NOTE — CERTIFICATION
**Certification    PHYSICIAN Certification of Need for Inpatient Hospitalization    Based on the her current state of illness, Joe Ozuna requires inpatient hospitalization for her acute on chronic  CHF, Mitral valve regurgitation, COPD      This requires Called patient to follow up on clearance for 07/08 surgery. Reporting \"did not think surgery was actually scheduled\". Would like to cancel surgery. Will call office back to schedule office visit at another time.

## (undated) NOTE — IP AVS SNAPSHOT
Patient Demographics     Address  [place] [address] Phone  [phone] [facility])  [phone] [facility] E-mail Address  [email or web address]      Emergency Contact(s)     Name Relation Home Work Gely West [phone] Accu-Chek Softclix Lancets Misc      Check blood sugar daily. Dx: E11.65, with current long term use of insulin.    Monica Marie MD         acetaminophen 500 MG Tabs  Commonly known as:  TYLENOL EXTRA STRENGTH      Take 500 mg by mouth every 6 (six) diamante Take 3 mL by nebulization 4 (four) times daily. Leyla Robertson MD         isosorbide dinitrate 10 MG Tabs  Commonly known as:  ISORDIL  Next dose due:  1/30      Take 1 tablet (10 mg total) by mouth every 8 (eight) hours.    MD Paige Sanchez [id number] - MAR ACTION REPORT  (last 24 hrs)    ** SITE UNKNOWN **     Order ID Medication Name Action Time Action Reason Comments    [id number] ALPRAZolam Cuauhtemoc Dill) tab 0.25 mg 01/28/20 2204 Given      [id number] ALPRAZolam (XANAX) tab 0.25 mg 01/2 [id number] isosorbide dinitrate (ISORDIL) tab 10 mg 01/29/20 0936 Given      [id number] isosorbide dinitrate (ISORDIL) tab 10 mg 01/29/20 1723 Given      [id number] losartan (COZAAR) tab 100 mg 01/29/20 0936 Given      [id number] metoprolol succinate (Toprol SpO2  100 % Filed at 01/29/2020 1642      Patient's Most Recent Weight       Most Recent Value   Patient Weight  105.8 kg (233 lb 4 oz)      CPAP Settings (Inpatient)       Most Recent Value   Mode  Spontaneous   Interface  Full face mask   Mask size  [facility]  Cardiology H&P[MD.1]    Naseem Mccallum[MD.2] Patient Status:  Inpatient    6/10/1945 MRN [id number]   [facility] [place] 7NE-A Attending Kaye Fallon MD   Hosp Day #[MD.1] 0[MD.2]  [facility], MD[MD.2]         History that she needs to drink only thickened fluids and not watery liquids.     History:[MD.1]  Past Medical History:   Diagnosis Date   • CAD (coronary artery disease)    • [facility]   • Depression    • Heart burn    • Hypothy •  magnesium hydroxide (MILK OF MAGNESIA) 400 MG/5ML suspension 30 mL, 30 mL, Oral, Daily PRN  •  Heparin Sodium (Porcine) 5000 UNIT/ML injection 5,000 Units, 5,000 Units, Subcutaneous, 2 times per day  •  [START ON 1/23/2020] amLODIPine Besylate (NORVASC) Physical Exam:   General: Alert and oriented x 3. No apparent distress. She has a very husky voice. She has evident aphasia. HEENT: Normocephalic, anicteric sclera. No jvd; carotids: no bruits; no tm; mouth moist.   Cardiac: Regular rate and rhythm.  S1, History of coronary angioplasty with insertion of stent    Diastolic CHF due to valvular disease ([address]    Anemia[MD.2]    Copd on home oxygen          Recommendations: 1. Pulmonary consultation. 2.  Transthoracic echo.   3.  Infectious disease consultat Pablo Biggs is a a(n)[MD.3 69year old[MD.2] female with hypertension, diabetes, coronary artery disease status post remote stenting, status post 4 strokes the last 1 leaving her with aspiration issues and aphasia, who has had a difficult prior year • Osteoarthrosis, unspecified whether generalized or localized, unspecified site    • Other and unspecified hyperlipidemia    • Type II or unspecified type diabetes mellitus without mention of complication, not stated as uncontrolled    • Unspecified essen •  [START ON 1/23/2020] aspirin EC tab 81 mg, 81 mg, Oral, Daily  •  atorvastatin (LIPITOR) tab 20 mg, 20 mg, Oral, Nightly  •  [START ON 1/23/2020] escitalopram (LEXAPRO) tablet 15 mg, 15 mg, Oral, Daily  •  [START ON 1/23/2020] hydrochlorothiazide (HYDRO 2/6 holosystolic murmur at the left lower sternal border radiating to the anterior axillary line. Lungs: Expiratory prolongation without rales. Abdomen: Soft, non-tender. BS normal. No masses felt. Extremities: Without clubbing, cyanosis or edema.   Pop increase hydralazine as her blood pressure tolerates and add nitrates as well. 6.  She will need a transesophageal echo prior to any consideration for mitral clip. Her lungs must be in as good a shape as possible prior to this.  She may be ready Friday fo General Medicine Discharge Summary     Patient ID:  Matthew Kim  69year old  6/10/1945    Admit date: 1/22/2020    Discharge date and time:1/2[JS.1]9[JS.2]/2020    Attending Physician: Huber Butt MD Per Cherri Ag doc note 1/17/20 Hg 6.6, rectal exam was done and guiac positive, no gross blood  was evaluated by Gi at OSH, etiology was not entirely clear but may have been due to acute on chronic blood loss,   - follow clinically, no overt bleeding  - cont Take 40 mg by mouth 2 (two) times daily before meals. sucralfate 1 g Oral Tab  Take 1 g by mouth 4 (four) times daily before meals and nightly. torsemide 20 MG Oral Tab  Take 20 mg by mouth daily.     Calcium Carbonate Antacid 500 MG Oral Chew Tab  Ch General: no acute distress, alert and oriented x 3  Heart: RRR  Lungs: clear bilaterally, no active wheezing  Abdomen: nontender, nondistended, intact BS  Extremities: no pedal edema   Neuro: CN inact, no focal deficits      Total time coordinating care fo Disposition: home    Important Follow up:  - PCP within   - Consults:     Jan  [address] [address]  In 2 weeks  Hospital follow up    - Labs: none  - Radiology: none    H Operative Procedures:        Patient instructions:      Current Discharge Medication List    START taking these medications    benzonatate 100 MG Oral Cap  Take 1 capsule (100 mg total) by mouth 3 (three) times daily as needed for cough.     budesonide 0.5 10 Units every 12 (twelve) hours.  Inject 16 units into the skin nightly     atorvastatin 20 MG Oral Tab  TAKE 1 TABLET BY MOUTH EVERY NIGHT AT BEDTIME    Citalopram Hydrobromide 20 MG Oral Tab  TAKE 1& 1/2 TABLET(30MG) BY MOUTH DAILY    aspirin 81 MG Oral One Shay Acevedo, [address] [place] [address] Raffaele                                                               [phone] ---------------------------------------------------------------------------- Keny Russo 228cm/sec. Mean gradient (S): 9mm Hg. Indexed valve area (VTI):    0.8cm^2/m^2. 3. Mitral valve: Mildly calcified annulus. 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 Transvalvular velocity was increased.     There was severe regurgita 0.8cm^2/m^2. Mean gradient (S): 9mm Hg. Peak gradient (S): 21mm Hg. Tricuspid valve:   Structurally normal valve. Doppler:  Transvalvular velocity was within the normal range. There was no evidence for stenosis. There was moderate regurgitation.  Pulm ---------  Aortic mean gradient, S                        9     mm Hg    ---------  Aortic peak gradient, S                        21    mm Hg    ---------  Aortic valve area, VTI                         1.74  cm^2     ---------  Aortic valve area/bsa, VTI ---------   Tricuspid valve                                Value          Reference  Tricuspid regurg peak velocity                 3.99  m/sec    ---------  Tricuspid peak RV-RA gradient                  64    mm Hg    ---------  Tricuspid maximal regurg HTN (hypertension)    CVA, old, ataxia    Dysphagia due to old cerebrovascular accident    Coronary artery disease involving native coronary artery of native heart without angina pectoris    History of coronary angioplasty with insertion of stent    Ashley Singh Ambulation oxygen flow (liters per minute): 2[TB. 2]        AM-PAC '6-Clicks' INPATIENT SHORT FORM - BASIC MOBILITY  How much difficulty does the patient currently have. ..[TB.1]  -   Turning over in bed (including adjusting bedclothes, sheets and blankets)? her. After a short rest pt was able to complete sit to stand from the chair using the RW at min assist again. Pt required less cuing for posture and pelvic tilt.  Pt then ambulated back to the room using the RW at min assist. Pt required same assistance wit tolerance, and functional mobility.  Due to pt medical complexity RIKKI services and therapy intensity are most appropriate for pt at this time.[TB.1] Pt currently requires min assist for all functional mobility.[TB.2]  If pt denies RIKKI recommendation pt alu AR.1 Emeli Hood, ELEAZAR on 1/27/2020 11:38 AM                        Occupational Therapy Notes (last 72 hours) (Notes from 1/26/2020  6:24 PM through 1/29/2020  6:24 PM)      Occupational Therapy Note signed by Soniya Gooden OT at 1/28/2020  1: Past Surgical History  Past Surgical History:   Procedure Laterality Date   • CATH PERCUTANEOUS  TRANSLUMINAL CORONARY ANGIOPLASTY      STENT   • CHOLECYSTECTOMY     • D & C     • LAP, SURG; COLECTOMY, PARTIAL, W/END COLOSTOMY & CLOSURE, DISTAL SEGMENT grooming while seated, supervision. Pt was tearful when she was telling this OT about her wanting to return to assisted living. Pt misses the staff there. Supportive brother present during the session. Patient End of Session: Up in chair;Needs met; Enrique SLP Note signed by DASH Cisse at 1/23/2020  1:01 PM  Version 1 of 1    Author:  DASH Cisse Service:  Rehab Author Type:  SPEECH-LANGUAGE PATHOLOGIST    Filed:  1/23/2020  1:01 PM Date of Service:  1/23/2020 11:36 AM Status:  Signed Recommendations posted at bedside[CJ.2]            GOALS  Goal #1[CJ.1] The patient will tolerate mechanical soft chopped consistency and nectar thick liquids without overt signs or symptoms of aspiration with 95 % accuracy over 1 session(s).   Met[CJ.2]

## (undated) NOTE — IP AVS SNAPSHOT
[address]            (For Outpatient Use Only) Initial Admit Date: 1/22/2020   Inpt/Obs Admit Date: Inpt: 1/22/20 / Obs: N/A   Discharge Date:    Sherine Montero:  [de-identified]   MRN: [de-identified]   CSN: [id number]   CEID: [id number] Subscriber ID:  Pt Rel to Subscriber:    Hospital Account Financial Class: Medicare    January 29, 2020